# Patient Record
Sex: MALE | Race: WHITE | NOT HISPANIC OR LATINO | Employment: UNEMPLOYED | URBAN - METROPOLITAN AREA
[De-identification: names, ages, dates, MRNs, and addresses within clinical notes are randomized per-mention and may not be internally consistent; named-entity substitution may affect disease eponyms.]

---

## 2017-02-07 ENCOUNTER — OFFICE VISIT (OUTPATIENT)
Dept: OPTOMETRY | Facility: CLINIC | Age: 62
End: 2017-02-07

## 2017-02-07 ENCOUNTER — OFFICE VISIT (OUTPATIENT)
Dept: OPHTHALMOLOGY | Facility: CLINIC | Age: 62
End: 2017-02-07
Attending: OPHTHALMOLOGY
Payer: MEDICARE

## 2017-02-07 DIAGNOSIS — H02.226: ICD-10-CM

## 2017-02-07 DIAGNOSIS — H18.602 KERATOCONUS OF LEFT EYE: ICD-10-CM

## 2017-02-07 DIAGNOSIS — H16.212 EXPOSURE KERATOCONJUNCTIVITIS, LEFT: Primary | ICD-10-CM

## 2017-02-07 DIAGNOSIS — H16.212 EXPOSURE KERATOPATHY, LEFT: Primary | ICD-10-CM

## 2017-02-07 DIAGNOSIS — H18.712 CORNEAL ECTASIA DUE TO LASER IN SITU KERATOMILEUSIS OF LEFT EYE: ICD-10-CM

## 2017-02-07 DIAGNOSIS — H59.89 CORNEAL ECTASIA DUE TO LASER IN SITU KERATOMILEUSIS OF LEFT EYE: ICD-10-CM

## 2017-02-07 PROCEDURE — 99213 OFFICE O/P EST LOW 20 MIN: CPT | Mod: ZF

## 2017-02-07 ASSESSMENT — REFRACTION_CURRENTRX
OS_SPHERE: -5.00
OS_BASECURVE: 5.0
OS_DIAMETER: 15.8
OS_BRAND: MSD

## 2017-02-07 ASSESSMENT — VISUAL ACUITY
OS_CC: 20/25-2
OD_SC+: 2
CORRECTION_TYPE: CONTACTS
METHOD: SNELLEN - LINEAR
OS_CC+: -2
CORRECTION_TYPE: CONTACTS
OS_CC: 20/25
OD_SC: 20/30
OD_SC: 20/30-2
METHOD: SNELLEN - LINEAR

## 2017-02-07 ASSESSMENT — CONF VISUAL FIELD
OS_NORMAL: 1
OD_NORMAL: 1

## 2017-02-07 ASSESSMENT — TONOMETRY
OD_IOP_MMHG: 08
OS_IOP_MMHG: 08
IOP_METHOD: TONOPEN

## 2017-02-07 ASSESSMENT — EXTERNAL EXAM - RIGHT EYE
OD_EXAM: NORMAL
OD_EXAM: NORMAL

## 2017-02-07 ASSESSMENT — CUP TO DISC RATIO
OS_RATIO: 0.4
OD_RATIO: 0.3

## 2017-02-07 ASSESSMENT — SLIT LAMP EXAM - LIDS
COMMENTS: NORMAL
COMMENTS: NORMAL

## 2017-02-07 NOTE — PROGRESS NOTES
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 Cevallos today    We discussed several options for optimization of his treatment. I feel he would do well with a Hydra-PEG coating to improve front surface wetting/clouding. This would require a refit, which he needs anyway as he is unable to tolerate his current lens past 8-10 hours. We can do this with a standard lab (likely [facility]) or with an Eyeprint lens. Pro's/con's of each were discussed. He prefers strongly to go with an Eyeprint lens.  I would like him to discuss with Dr. Cevallos whether crosslinking is recommended - if he does proceed with that we should wait for refit until after. If deferring, he should return at his convenience after min 48 hours out of lens for impression.

## 2017-02-07 NOTE — NURSING NOTE
Chief Complaints and History of Present Illnesses   Patient presents with     Follow Up For     Exposure keratoconjunctivitis, left       HPI    Symptoms:     No blurred vision   No decreased vision   No floaters   No flashes         Do you have eye pain now?:  Yes   Location:  OS   Pain Frequency:  Constant      Comments:  Annual follow up Exposure keratoconjunctivitis, left .  The patient notes that he saw Dr. Friedman this morning.  The patient notes that his right eye can't see after being in the light to the dark.    JONATHAN Henderson 12:44 PM 02/07/2017

## 2017-02-07 NOTE — PROGRESS NOTES
Cc: f/u exposure keratopathy    HPI: Chirag Narvaez is a 61 yea old male who presents for f/u and evaluation of left eye lagophthalmos with use of scleral lens. Patient states doing well with use of scleral lens OU (OS chronic), with continued improvement of pain and irritation. Saw Dr Friedman today, fitted for new Sceral lens with Hydra-PEG coating. Vision stable    Still has issues with chronic discomfort on further discussion    POHx:  -hx of LASIK OU with post-LASIK ectasia   -hx of   -hx of scleral lens use OU    Current Meds:   -PF AT gtts and scleral lens gtts     Assessment & Plan   Chirag Narvaez is a 60 year old male with the following diagnoses:     1. Exposure keratoconjunctivitis, left  Secondary to facial surgery, lid dysfunction  Much improved with scleral lens, tarso, gold weight  Inferior band of punctate erosions/exposure keratopathy  -recommend PFATs 3-4x daily. not using any lubricating drops currently    Still with signficant pain    2. Mechanical lagophthalmos, left  As above    3. Corneal ectasia, unspecified laterality  Post laser in situ keratomileusis (LASIK), with add'l thinning from #1  Appears stable  Vision unchanged    -continue Scleral lens    - restart Celluvisc tears  - nighttime ointment - Refresh pm - coached on technique    - suggested ASEDs - defer for now    Saw Dr DOLAN today- fitted for new Scleral lens with lubricating coating. Doing well.        RTC in 1 year, earlier if interested in reassessment to discuss / order ASEDS    Olvin Estrada MD  PGY3, Dept of Ophthalmology    ~~~~~~~~~~~~~~~~~~~~~~~~~~~~~~~~~~~~~~~~~~~~~~~~~~~~~~~~~~~~~~~~    I have personally examined the patient and agree with the assessment and plan as delineated by the resident / fellow.  I have confirmed the contents of the chief complaint, history of present illness, review of systems, and medical / surgical history sections and edited the note as needed.    Turner Cevallos MD, MA  Director,  Cornea & Anterior Segment  [facility] Department of Ophthalmology & Visual Neuroscience

## 2017-02-07 NOTE — MR AVS SNAPSHOT
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 keratomileusis of left eye            Follow-ups after your visit        Your next 10 appointments already scheduled     Feb 13, 2017  1:30 PM   Return Visit with Jeannine Friedman OD   Eye Clinic ([facility])    [facility]  Clinic 9a  [address] [place] [address]   [phone]            Mar 01, 2017  1:30 PM   Return Visit with Jeannine Friedman OD   Eye Clinic ([facility])    [facility] 9a  [address] [place] [address]   [phone]              Who to contact     Please call your clinic at [phone] to:    Ask questions about your health    Make or cancel appointments    Discuss your medicines    Learn about your test results    Speak to your doctor   If you have compliments or concerns about an experience at your clinic, or if you wish to file a complaint, please contact [facility] Patient Relations at [phone] or email us at [email or web address]         Additional Information About Your Visit        MyChart Information     [employer] gives you secure access to your electronic health record. If you see a primary care provider, you can also send messages to your care team and make appointments. If you have questions, please call your primary care clinic.  If you do not have a primary care provider, please call [phone] and they will assist you.      [employer] is an electronic gateway that provides easy, online access to your medical records. With [employer], you can request a clinic  appointment, read your test results, renew a prescription or communicate with your care team.     To access your existing account, please contact your [facility] or call [phone] for assistance.        Care EveryWhere ID     This is your Care EveryWhere ID. This could be used by other organizations to access your [place] medical records  [id number]         Blood Pressure from Last 3 Encounters:   06/13/14 124/81   05/17/12 121/73   05/01/12 111/67    Weight from Last 3 Encounters:   10/19/16 74.844 kg (165 lb)   06/24/15 74.163 kg (163 lb 8 oz)   01/07/15 70.761 kg (156 lb)              Today, you had the following     No orders found for display       Primary Care Provider Office Phone # Fax #    James Marquezdegary [phone]       [facility] [address] [facility] [address]        Thank you!     Thank you for choosing EYE CLINIC  for your care. Our goal is always to provide you with excellent care. Hearing back from our patients is one way we can continue to improve our services. Please take a few minutes to complete the written survey that you may receive in the mail after your visit with us. Thank you!             Your Updated Medication List - Protect others around you: Learn how to safely use, store and throw away your medicines at www.disposemymeds.org.          This list is accurate as of: 2/7/17  2:56 PM.  Always use your most recent med list.                   Brand Name Dispense Instructions for use    * AMYLASE CONCENTRATE PO      Take  by mouth.       * DIGESTIVE ENZYMES PO      Take  by mouth.       CURCUMIN          HERBAL ENERGY COMPLEX PO      Take  by mouth.       Iodine Bell          MAG-CAPS OR      Take  by mouth.       MODIFIED CITRUS PECTIN 100-25-2.5 MG Tabs      Take  by mouth.       UBIQUINOL PO      Take  by mouth.       VITAMIN D3          * Notice:  This list has 2 medication(s) that are the same as other  medications prescribed for you. Read the directions carefully, and ask your doctor or other care provider to review them with you.

## 2017-02-13 ENCOUNTER — OFFICE VISIT (OUTPATIENT)
Dept: OPTOMETRY | Facility: CLINIC | Age: 62
End: 2017-02-13

## 2017-02-13 DIAGNOSIS — H18.712 CORNEAL ECTASIA DUE TO LASER IN SITU KERATOMILEUSIS OF LEFT EYE: ICD-10-CM

## 2017-02-13 DIAGNOSIS — H16.212 EXPOSURE KERATOPATHY, LEFT: Primary | ICD-10-CM

## 2017-02-13 DIAGNOSIS — H59.89 CORNEAL ECTASIA DUE TO LASER IN SITU KERATOMILEUSIS OF LEFT EYE: ICD-10-CM

## 2017-02-13 ASSESSMENT — VISUAL ACUITY
OD_SC: 20/30-2
OS_SC: 20/200
METHOD: SNELLEN - LINEAR

## 2017-02-13 ASSESSMENT — REFRACTION_CURRENTRX
OS_SPHERE: PLANO
OS_SPHERE: -5.00
OS_DIAMETER: 16.6
OS_DIAMETER: 15.8
OS_BRAND: MSD

## 2017-02-13 NOTE — MR AVS SNAPSHOT
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)    [facility] 9th Fl  Clinic 9a  [address] [facility] [address]   [phone]              Who to contact     Please call your clinic at [phone] to:    Ask questions about your health    Make or cancel appointments    Discuss your medicines    Learn about your test results    Speak to your doctor   If you have compliments or concerns about an experience at your clinic, or if you wish to file a complaint, please contact [facility] Patient Relations at [phone] or email us at [email or web address]         Additional Information About Your Visit        MyChart Information     [employer] gives you secure access to your electronic health record. If you see a primary care provider, you can also send messages to your care team and make appointments. If you have questions, please call your primary care clinic.  If you do not have a primary care provider, please call [phone] and they will assist you.      [employer] is an electronic gateway that provides easy, online access to your medical records. With [employer], you can request a clinic appointment, read your test results, renew a prescription or communicate with your care team.     To access your existing account, please contact your [facility] or call [phone] for  assistance.        Care EveryWhere ID     This is your Care EveryWhere ID. This could be used by other organizations to access your [place] medical records  [id number]         Blood Pressure from Last 3 Encounters:   06/13/14 124/81   05/17/12 121/73   05/01/12 111/67    Weight from Last 3 Encounters:   10/19/16 74.8 kg (165 lb)   06/24/15 74.2 kg (163 lb 8 oz)   01/07/15 70.8 kg (156 lb)              Today, you had the following     No orders found for display       Primary Care Provider Office Phone # Fax #    James Goodwin [phone]       [facility] [address] [facility] [address]        Thank you!     Thank you for choosing EYE CLINIC  for your care. Our goal is always to provide you with excellent care. Hearing back from our patients is one way we can continue to improve our services. Please take a few minutes to complete the written survey that you may receive in the mail after your visit with us. Thank you!             Your Updated Medication List - Protect others around you: Learn how to safely use, store and throw away your medicines at www.disposemymeds.org.          This list is accurate as of: 2/13/17 11:59 PM.  Always use your most recent med list.                   Brand Name Dispense Instructions for use    * AMYLASE CONCENTRATE PO      Take  by mouth.       * DIGESTIVE ENZYMES PO      Take  by mouth.       CURCUMIN          HERBAL ENERGY COMPLEX PO      Take  by mouth.       Iodine Bell          MAG-CAPS OR      Take  by mouth.       MODIFIED CITRUS PECTIN 100-25-2.5 MG Tabs      Take  by mouth.       UBIQUINOL PO      Take  by mouth.       VITAMIN D3          * Notice:  This list has 2 medication(s) that are the same as other medications prescribed for you. Read the directions carefully, and ask your doctor or other care provider to review them with you.

## 2017-02-13 NOTE — PROGRESS NOTES
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 Resume AT as previous.    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 These are not cosmetic contact lenses.     Encounter Diagnoses   Name Primary?     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

## 2017-02-14 ASSESSMENT — SLIT LAMP EXAM - LIDS: COMMENTS: NORMAL

## 2017-02-14 ASSESSMENT — EXTERNAL EXAM - RIGHT EYE: OD_EXAM: NORMAL

## 2017-03-01 ENCOUNTER — OFFICE VISIT (OUTPATIENT)
Dept: OPTOMETRY | Facility: CLINIC | Age: 62
End: 2017-03-01

## 2017-03-01 DIAGNOSIS — H16.212 EXPOSURE KERATOPATHY, LEFT: Primary | ICD-10-CM

## 2017-03-01 DIAGNOSIS — H59.89 CORNEAL ECTASIA DUE TO LASER IN SITU KERATOMILEUSIS OF LEFT EYE: ICD-10-CM

## 2017-03-01 DIAGNOSIS — H18.712 CORNEAL ECTASIA DUE TO LASER IN SITU KERATOMILEUSIS OF LEFT EYE: ICD-10-CM

## 2017-03-01 ASSESSMENT — VISUAL ACUITY
OS_CC: 20/30-1
METHOD: SNELLEN - LINEAR
CORRECTION_TYPE: CONTACTS
OD_SC: 20/20

## 2017-03-01 ASSESSMENT — REFRACTION_CURRENTRX
OS_DIAMETER: 17.0
OS_SPHERE: +3.37
OS_BASECURVE: 8.129
OS_BRAND: EYEPRINT
OS_ADDL_SPECS: OPTIMUM EXTRA

## 2017-03-01 ASSESSMENT — SLIT LAMP EXAM - LIDS: COMMENTS: NORMAL

## 2017-03-01 ASSESSMENT — EXTERNAL EXAM - RIGHT EYE: OD_EXAM: NORMAL

## 2017-03-01 NOTE — MR AVS SNAPSHOT
After Visit Summary   3/1/2017    Chirag Narvaez    MRN: [id number]           Patient Information     Date Of Birth          1955        Visit Information        Provider Department      3/1/2017 1:30 PM [facility]ine [facility] OD [facility]         Follow-ups after your visit        Your next 10 appointments already scheduled     Mar 29, 2017  1:30 PM CDT   Return Visit with Jeannine Friedman OD   Eye Clinic ([facility])    [facility] 9th Fl  Clinic 9a  [address] [place] [address]   [phone]              Who to contact     Please call your clinic at [phone] to:    Ask questions about your health    Make or cancel appointments    Discuss your medicines    Learn about your test results    Speak to your doctor   If you have compliments or concerns about an experience at your clinic, or if you wish to file a complaint, please contact [facility] Patient Relations at [phone] or email us at [email or web address]         Additional Information About Your Visit        MyChart Information     [employer] gives you secure access to your electronic health record. If you see a primary care provider, you can also send messages to your care team and make appointments. If you have questions, please call your primary care clinic.  If you do not have a primary care provider, please call [phone] and they will assist you.      [employer] is an electronic gateway that provides easy, online access to your medical records. With [employer], you can request a clinic appointment, read your test results, renew a prescription or communicate with your care team.     To access your existing account, please contact your [facility] or call [phone] for assistance.        Care EveryWhere ID     This is your Care EveryWhere ID. This could be used by other organizations to access your [facility]  records  [id number]         Blood Pressure from Last 3 Encounters:   06/13/14 124/81   05/17/12 121/73   05/01/12 111/67    Weight from Last 3 Encounters:   10/19/16 74.8 kg (165 lb)   06/24/15 74.2 kg (163 lb 8 oz)   01/07/15 70.8 kg (156 lb)              Today, you had the following     No orders found for display       Primary Care Provider Office Phone # Fax #    James Goodwin [phone]       [facility] [address]  [place] [address]        Thank you!     Thank you for choosing EYE CLINIC  for your care. Our goal is always to provide you with excellent care. Hearing back from our patients is one way we can continue to improve our services. Please take a few minutes to complete the written survey that you may receive in the mail after your visit with us. Thank you!             Your Updated Medication List - Protect others around you: Learn how to safely use, store and throw away your medicines at www.disposemymeds.org.          This list is accurate as of: 3/1/17  2:05 PM.  Always use your most recent med list.                   Brand Name Dispense Instructions for use    * AMYLASE CONCENTRATE PO      Take  by mouth.       * DIGESTIVE ENZYMES PO      Take  by mouth.       CURCUMIN          HERBAL ENERGY COMPLEX PO      Take  by mouth.       Iodine Bell          MAG-CAPS OR      Take  by mouth.       MODIFIED CITRUS PECTIN 100-25-2.5 MG Tabs      Take  by mouth.       UBIQUINOL PO      Take  by mouth.       VITAMIN D3          * Notice:  This list has 2 medication(s) that are the same as other medications prescribed for you. Read the directions carefully, and ask your doctor or other care provider to review them with you.

## 2017-03-01 NOTE — PROGRESS NOTES
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

## 2017-03-29 ENCOUNTER — OFFICE VISIT (OUTPATIENT)
Dept: OPTOMETRY | Facility: CLINIC | Age: 62
End: 2017-03-29

## 2017-03-29 DIAGNOSIS — H59.89 CORNEAL ECTASIA DUE TO LASER IN SITU KERATOMILEUSIS OF LEFT EYE: ICD-10-CM

## 2017-03-29 DIAGNOSIS — H18.712 CORNEAL ECTASIA DUE TO LASER IN SITU KERATOMILEUSIS OF LEFT EYE: ICD-10-CM

## 2017-03-29 DIAGNOSIS — H16.212 EXPOSURE KERATOPATHY, LEFT: Primary | ICD-10-CM

## 2017-03-29 ASSESSMENT — VISUAL ACUITY
OS_CC: 20/40
OD_SC: 20/30
METHOD: SNELLEN - LINEAR
CORRECTION_TYPE: CONTACTS

## 2017-03-29 ASSESSMENT — REFRACTION_CURRENTRX
OS_ADDL_SPECS: OPTIMUM EXTRA
OS_SPHERE: +3.37
OS_BRAND: EYEPRINT
OS_BASECURVE: 8.129
OS_DIAMETER: 17.0

## 2017-03-29 ASSESSMENT — EXTERNAL EXAM - RIGHT EYE: OD_EXAM: NORMAL

## 2017-03-29 ASSESSMENT — SLIT LAMP EXAM - LIDS: COMMENTS: NORMAL

## 2017-03-29 ASSESSMENT — REFRACTION_MANIFEST
OD_CYLINDER: SPHERE
OD_SPHERE: -1.25

## 2017-03-29 NOTE — MR AVS SNAPSHOT
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           Follow-ups after your visit        Who to contact     Please call your clinic at [phone] to:    Ask questions about your health    Make or cancel appointments    Discuss your medicines    Learn about your test results    Speak to your doctor   If you have compliments or concerns about an experience at your clinic, or if you wish to file a complaint, please contact [facility] Patient Relations at [phone] or email us at [email or web address]         Additional Information About Your Visit        MyChart Information     "[employer] gives you secure access to your electronic health record. If you see a primary care provider, you can also send messages to your care team and make appointments. If you have questions, please call your primary care clinic.  If you do not have a primary care provider, please call [phone] and they will assist you.      [employer] is an electronic gateway that provides easy, online access to your medical records. With [employer], you can request a clinic appointment, read your test results, renew a prescription or communicate with your care team.     To access your existing account, please contact your [facility] or call [phone] for assistance.        Care EveryWhere ID     This is your Care EveryWhere ID. This could be used by other organizations to access your [place] medical records  [id number]         Blood Pressure from Last 3 Encounters:   06/13/14 124/81   05/17/12 121/73   05/01/12 111/67    Weight  from Last 3 Encounters:   10/19/16 74.8 kg (165 lb)   06/24/15 74.2 kg (163 lb 8 oz)   01/07/15 70.8 kg (156 lb)              Today, you had the following     No orders found for display       Primary Care Provider Office Phone # Fax #    James Goodwin [phone]       [facility] [address] [facility] [address]        Thank you!     Thank you for choosing EYE CLINIC  for your care. Our goal is always to provide you with excellent care. Hearing back from our patients is one way we can continue to improve our services. Please take a few minutes to complete the written survey that you may receive in the mail after your visit with us. Thank you!             Your Updated Medication List - Protect others around you: Learn how to safely use, store and throw away your medicines at www.disposemymeds.org.          This list is accurate as of: 3/29/17  3:08 PM.  Always use your most recent med list.                   Brand Name Dispense Instructions for use    * AMYLASE CONCENTRATE PO      Take  by mouth.       * DIGESTIVE ENZYMES PO      Take  by mouth.       CURCUMIN          HERBAL ENERGY COMPLEX PO      Take  by mouth.       Iodine Bell          MAG-CAPS OR      Take  by mouth.       MODIFIED CITRUS PECTIN 100-25-2.5 MG Tabs      Take  by mouth.       UBIQUINOL PO      Take  by mouth.       VITAMIN D3          * Notice:  This list has 2 medication(s) that are the same as other medications prescribed for you. Read the directions carefully, and ask your doctor or other care provider to review them with you.

## 2017-03-29 NOTE — PROGRESS NOTES
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

## 2017-08-17 ENCOUNTER — TELEPHONE (OUTPATIENT)
Dept: OPHTHALMOLOGY | Facility: CLINIC | Age: 62
End: 2017-08-17

## 2017-08-17 NOTE — TELEPHONE ENCOUNTER
Fine with a lens for 6 months, she would give the pt a green light to go ahead and have the stitch removed in the corner of his eye that was put there back in 2017 by [place].  He is doing fine with the lens, and he was told that he would then have to check in with Dr. Cevallos about removing that stitch.  Pt is wondering if he can make an appt with Dr. Cevallos, and have the stitch removed at that same appt.  Pt is coming from [place].  Please call pt at [phone] to let him know if that is possible      Left message with date time of appt sept 19th  Scheduled with dr. farah and dr. Cevallos same day per request    Direct number provided to confirm appt's or help reschedule if needed  Anthony Chambers RN 4:21 PM 08/17/17

## 2017-08-17 NOTE — TELEPHONE ENCOUNTER
----- Message from Hermila Hilary sent at 8/17/2017  4:02 PM CDT -----  Regarding: pt reporting in to let us know that Dr. Friedman told pt back in Feb 2017---if pt did..  Contact: [phone]  Fine with a lens for 6 months, she would give the pt a green light to go ahead and have the stitch removed in the corner of his eye that was put there back in 2017 by [place].  He is doing fine with the lens, and he was told that he would then have to check in with Dr. Cevallos about removing that stitch.  Pt is wondering if he can make an appt with Dr. Cevallos, and have the stitch removed at that same appt.  Pt is coming from [place].  Please call pt at [phone] to let him know if that is possible.    Thank you!  Camron PLASENCIA    Please DO NOT send this message and/or reply back to sender.  Call Center Representatives DO NOT respond to messages.

## 2017-08-21 ENCOUNTER — TELEPHONE (OUTPATIENT)
Dept: OPHTHALMOLOGY | Facility: CLINIC | Age: 62
End: 2017-08-21

## 2017-08-21 NOTE — TELEPHONE ENCOUNTER
Patient called wanting to set up appt to remove tarso.  I sent to fellow to see if that is a possible appt.

## 2017-09-12 ENCOUNTER — OFFICE VISIT (OUTPATIENT)
Dept: OPHTHALMOLOGY | Facility: CLINIC | Age: 62
End: 2017-09-12
Attending: OPHTHALMOLOGY
Payer: MEDICARE

## 2017-09-12 DIAGNOSIS — H18.602 KERATOCONUS OF LEFT EYE: ICD-10-CM

## 2017-09-12 DIAGNOSIS — H18.719: ICD-10-CM

## 2017-09-12 DIAGNOSIS — H16.212 EXPOSURE KERATOCONJUNCTIVITIS, LEFT: Primary | ICD-10-CM

## 2017-09-12 DIAGNOSIS — H02.226: ICD-10-CM

## 2017-09-12 PROCEDURE — 99213 OFFICE O/P EST LOW 20 MIN: CPT | Mod: ZF

## 2017-09-12 ASSESSMENT — SLIT LAMP EXAM - LIDS: COMMENTS: NORMAL

## 2017-09-12 ASSESSMENT — CONF VISUAL FIELD
OD_NORMAL: 1
OS_NORMAL: 1
METHOD: COUNTING FINGERS

## 2017-09-12 ASSESSMENT — EXTERNAL EXAM - RIGHT EYE: OD_EXAM: NORMAL

## 2017-09-12 ASSESSMENT — TONOMETRY
OD_IOP_MMHG: 10
IOP_METHOD: ICARE
OS_IOP_MMHG: RGP

## 2017-09-12 ASSESSMENT — VISUAL ACUITY
METHOD: SNELLEN - LINEAR
OS_CC+: -2
OD_SC+: +1
OD_SC: 20/30
OS_CC: 20/20

## 2017-09-12 NOTE — PROGRESS NOTES
Cc: f/u exposure keratopathy    HPI: Chirag Narvaez is a 61 yea old male who presents for f/u and evaluation of left eye lagophthalmos with use of scleral lens. Patient states doing well with use of scleral lens OU (OS chronic), with continued improvement of pain and irritation. Saw Dr Friedman today, fitted for new Sceral lens with Hydra-PEG coating. Vision stable    Interval:  Very happy with scleral contact lens and tranquil eyes. Stable vision, denies redness, flashes, floaters. Improved pain.    POHx:  -hx of LASIK OU with post-LASIK ectasia    -hx of scleral lens use OU    Current Meds:   -PF AT gtts and scleral lens gtts: stopped  Refresh danyel: stopped    Assessment & Plan   Chirag Narvaez is a 60 year old male with the following diagnoses:     1. Exposure keratoconjunctivitis, left  Secondary to facial surgery, lid dysfunction  Much improved with scleral lens, tarso, gold weight  Improved surface  recommend PFATs 2-4x daily. Not using any lubricating drops currently    2. Mechanical lagophthalmos, left  As above    3. Corneal ectasia, unspecified laterality  Post laser in situ keratomileusis (LASIK), with add'l thinning from #1  Appears stable  Vision unchanged    Counseled:    rec against opening tarso given current stability, comfort of scleral lens and good vision    - continue Scleral lens  - restart Celluvisc tears  - restart nighttime ointment - Refresh pm    - suggested ASEDs - defer for now  - Continue f/up with Dr. MAGDALENO VILLALTA in 1 year, earlier if interested in reassessment to discuss / order ASEDS, with shyanne OU      ROB Meneses  Cornea fellow      ~~~~~~~~~~~~~~~~~~~~~~~~~~~~~~~~~~~~~~~~~~~~~~~~~~~~~~~~~~~~~~~~    Complete documentation of historical and exam elements from today's encounter can be found in the full encounter summary report (not reduplicated in this progress note). I personally obtained the chief complaint(s) and history of present illness.  I confirmed and edited as  necessary the review of systems, past medical/surgical history, family history, social history, and examination findings as documented by others.  I examined the patient myself, and I personally reviewed the relevant tests, images, and reports as documented above. I formulated and edited as necessary the assessment and plan and discussed the findings and management plan with the patient and family.     Turner Cevallos MD, MA  Director, Cornea & Anterior Segment  [facility] Department of Ophthalmology & Visual Neuroscience

## 2017-09-12 NOTE — NURSING NOTE
Chief Complaints and History of Present Illnesses   Patient presents with     Follow Up For     Exposure keratopathy/lagophthalmos      HPI    Affected eye(s):  Both   Symptoms:        Frequency:  Constant          Comments:  States va is the same since last visit  No red watery or dry  Quyen Sanz COT 3:27 PM September 12, 2017

## 2017-09-12 NOTE — MR AVS SNAPSHOT
After Visit Summary   9/12/2017    Chirag Narvaez    MRN: [id number]           Patient Information     Date Of Birth          1955        Visit Information        Provider Department      9/12/2017 2:45 PM Turner Cevallos MD [facility]        Today's Diagnoses     Exposure keratoconjunctivitis, left - Left Eye    -  1    Mechanical lagophthalmos, left - Left Eye        Keratoconus of left eye - Left Eye        Corneal ectasia, unspecified laterality - Left Eye           Follow-ups after your visit        Follow-up notes from your care team     Return in about 1 year (around 9/12/2018) for Follow Up, Topography OU.      Your next 10 appointments already scheduled     Nov 08, 2017 11:30 AM CST   (Arrive by 11:15 AM)   Return Visit with James Patiño MD   [facility] Ear Nose and Throat ([facility])    [address] [place] [id number] [phone]              Who to contact     Please call your clinic at [phone] to:    Ask questions about your health    Make or cancel appointments    Discuss your medicines    Learn about your test results    Speak to your doctor   If you have compliments or concerns about an experience at your clinic, or if you wish to file a complaint, please contact [facility] Patient Relations at [phone] or email us at [email or web address]         Additional Information About Your Visit        MyChart Information     [employer] gives you secure access to your electronic health record. If you see a primary care provider, you can also send messages to your care team and make appointments. If you have questions, please call your primary care clinic.  If you do not have a primary care provider, please call [phone] and they will assist you.      [employer] is an electronic gateway that provides easy, online access to your medical records. With [employer], you can request a clinic  appointment, read your test results, renew a prescription or communicate with your care team.     To access your existing account, please contact your [facility] or call [phone] for assistance.        Care EveryWhere ID     This is your Care EveryWhere ID. This could be used by other organizations to access your [place] medical records  [id number]         Blood Pressure from Last 3 Encounters:   06/13/14 124/81   05/17/12 121/73   05/01/12 111/67    Weight from Last 3 Encounters:   10/19/16 74.8 kg (165 lb)   06/24/15 74.2 kg (163 lb 8 oz)   01/07/15 70.8 kg (156 lb)              Today, you had the following     No orders found for display       Primary Care Provider Office Phone # Fax #    James Goodwin [phone]       [facility] [address] [facility] [address]        Equal Access to Services     MISHA ALMODOVAR : Hadii aad ku hadasho Soomaali, waaxda luqadaha, qaybta kaalmada adeegyada, waxay idiin hayaan adeeg kharash sadia . So [facility] [phone].    ATENCIÓN: Si luannla español, tiene a dupont disposición servicios gratuitos de asistencia lingüística. Llame al [phone].    We comply with applicable federal civil rights laws and [place] laws. We do not discriminate on the basis of race, color, national origin, age, disability sex, sexual orientation or gender identity.            Thank you!     Thank you for choosing EYE CLINIC  for your care. Our goal is always to provide you with excellent care. Hearing back from our patients is one way we can continue to improve our services. Please take a few minutes to complete the written survey that you may receive in the mail after your visit with us. Thank you!             Your Updated Medication List - Protect others around you: Learn how to safely use, store and throw away your medicines at www.disposemymeds.org.          This list is accurate as of: 9/12/17 11:59 PM.  Always use your  most recent med list.                   Brand Name Dispense Instructions for use Diagnosis    * AMYLASE CONCENTRATE PO      Take  by mouth.        * DIGESTIVE ENZYMES PO      Take  by mouth.        CURCUMIN           HERBAL ENERGY COMPLEX PO      Take  by mouth.        Iodine Bell           MAG-CAPS OR      Take  by mouth.        MODIFIED CITRUS PECTIN 100-25-2.5 MG Tabs      Take  by mouth.        UBIQUINOL PO      Take  by mouth.        VITAMIN D3           * Notice:  This list has 2 medication(s) that are the same as other medications prescribed for you. Read the directions carefully, and ask your doctor or other care provider to review them with you.

## 2017-11-02 DIAGNOSIS — C49.9 FIBROMYXOSARCOMA (H): Primary | ICD-10-CM

## 2017-11-08 ENCOUNTER — OFFICE VISIT (OUTPATIENT)
Dept: OTOLARYNGOLOGY | Facility: CLINIC | Age: 62
End: 2017-11-08

## 2017-11-08 VITALS — BODY MASS INDEX: 24.73 KG/M2 | WEIGHT: 167 LBS | HEIGHT: 69 IN

## 2017-11-08 DIAGNOSIS — C49.9 FIBROMYXOSARCOMA (H): Primary | ICD-10-CM

## 2017-11-08 RX ORDER — OFLOXACIN 3 MG/ML
5 SOLUTION AURICULAR (OTIC) 2 TIMES DAILY
Qty: 5 ML | Refills: 1 | Status: SHIPPED | OUTPATIENT
Start: 2017-11-08 | End: 2017-11-18

## 2017-11-08 ASSESSMENT — PAIN SCALES - GENERAL: PAINLEVEL: MILD PAIN (2)

## 2017-11-08 NOTE — MR AVS SNAPSHOT
After Visit Summary   11/8/2017    Chirag Narvaez    MRN: [id number]           Patient Information     Date Of Birth          1955        Visit Information        Provider Department      11/8/2017 11:30 AM James Patiño MD [facility] Ear Nose and Throat        Today's Diagnoses     Fibromyxosarcoma (H)    -  1      Care Instructions    MRI to be scheduled  An appt to have your ears cleaned will be schedu;ed  Follow up with Dr. Patiño in 1 year  Call me if ?'s arise [phone]  Merissa Morris RN              Follow-ups after your visit        Your next 10 appointments already scheduled     Nov 17, 2017  2:30 PM CST   (Arrive by 2:15 PM)   New Patient Visit with Vasquez Romo MD   [facility] Ear Nose and Throat ([facility] [place])    [address] [id number] [phone]            Nov 17, 2017  4:00 PM CST   MR SOFT TISSUE NECK W/O & W CONTRAST with UUMR1   [facility] [place], [facility]    [address] [facility] [id number] [phone]           Take your medicines as usual, unless your doctor tells you not to. Bring a list of your current medicines to your exam (including vitamins, minerals and over-the-counter drugs).  You will be given intravenous contrast for this exam. To prepare:   The day before your exam, drink extra fluids at least six 8-ounce glasses (unless your doctor tells you to restrict your fluids).   Have a blood test (creatinine test) within 30 days of your exam. Go to your clinic or Diagnostic Imaging Department for this test.  The MRI machine uses a strong magnet. Please wear clothes without metal (snaps, zippers). A sweatsuit works well, or we may give you a hospital gown.  Please remove any body piercings and hair extensions before you arrive. You will also remove watches, jewelry, hairpins, wallets, dentures, partial dental plates and hearing aids. You  may wear contact lenses, and you may be able to wear your rings. We have a safe place to keep your personal items, but it is safer to leave them at home.   **IMPORTANT** THE INSTRUCTIONS BELOW ARE ONLY FOR THOSE PATIENTS WHO HAVE BEEN TOLD THEY WILL RECEIVE SEDATION OR GENERAL ANESTHESIA DURING THEIR MRI PROCEDURE:  IF YOU WILL RECEIVE SEDATION (take medicine to help you relax during your exam):   You must get the medicine from your doctor before you arrive. Bring the medicine to the exam. Do not take it at home.   Arrive one hour early. Bring someone who can take you home after the test. Your medicine will make you sleepy. After the exam, you may not drive, take a bus or take a taxi by yourself.   No eating 8 hours before your exam. You may have clear liquids up until 4 hours before your exam. (Clear liquids include water, clear tea, black coffee and fruit juice without pulp.)  IF YOU WILL RECEIVE ANESTHESIA (be asleep for your exam):   Arrive 1 1/2 hours early. Bring someone who can take you home after the test. You may not drive, take a bus or take a taxi by yourself.   No eating 8 hours before your exam. You may have clear liquids up until 4 hours before your exam. (Clear liquids include water, clear tea, black coffee and fruit juice without pulp.)  Please call the Imaging Department at your exam site with any questions.            Nov 29, 2017 12:30 PM CST   New Visit with Ale Vergara MD   [facility]    [address] [place] [id number] [phone]            Dec 13, 2017   Procedure with David Jimenez MD   [facility])    [address] [facility] [id number] [phone]              Who to contact     Please call your clinic at [phone] to:    Ask questions about your health    Make or cancel appointments    Discuss your medicines    Learn about your test results    Speak to your doctor   If you have  "compliments or concerns about an experience at your clinic, or if you wish to file a complaint, please contact [facility] Patient Relations at [phone] or email us at [email or web address]         Additional Information About Your Visit        MyChart Information     [employer] gives you secure access to your electronic health record. If you see a primary care provider, you can also send messages to your care team and make appointments. If you have questions, please call your primary care clinic.  If you do not have a primary care provider, please call [phone] and they will assist you.      [employer] is an electronic gateway that provides easy, online access to your medical records. With [employer], you can request a clinic appointment, read your test results, renew a prescription or communicate with your care team.     To access your existing account, please contact your [facility] or call [phone] for assistance.        Care EveryWhere ID     This is your Care EveryWhere ID. This could be used by other organizations to access your [place] medical records  [id number]        Your Vitals Were     Height BMI (Body Mass Index)                1.753 m (5' 9\") 24.66 kg/m2           Blood Pressure from Last 3 Encounters:   06/13/14 124/81   05/17/12 121/73   05/01/12 111/67    Weight from Last 3 Encounters:   11/08/17 75.8 kg (167 lb)   10/19/16 74.8 kg (165 lb)   06/24/15 74.2 kg (163 lb 8 oz)              Today, you had the following     No orders found for display         Today's Medication Changes          These changes are accurate as of: 11/8/17 11:59 PM.  If you have any questions, ask your nurse or doctor.               Start taking these medicines.        Dose/Directions    ofloxacin 0.3 % otic solution   Commonly known as:  FLOXIN   Used for:  Fibromyxosarcoma (H)   Started by:  James Patiño MD        Dose:  5 drop   Place 5 drops into " both ears 2 times daily for 10 days   Quantity:  5 mL   Refills:  1            Where to get your medicines      These medications were sent to [facility], [place] - [place], [place] - [address] [facility]  [address] [facility] [address]     Phone:  [phone]     ofloxacin 0.3 % otic solution                Primary Care Provider Office Phone # Fax #    James Goodwin [phone]       [facility] [address]  [facility] [address]        Equal Access to Services     [facility]: Hadii aad ku hadasho Soomaali, waaxda luqadaha, qaybta kaalmada adeegyada, waxay idiin hayaan amadeo mccullough . So [facility] [phone].    ATENCIÓN: Si habla español, tiene a dupont disposición servicios gratuitos de asistencia lingüística. [facility] [phone].    We comply with applicable federal civil rights laws and [place] laws. We do not discriminate on the basis of race, color, national origin, age, disability, sex, sexual orientation, or gender identity.            Thank you!     Thank you for choosing [facility] EAR NOSE AND THROAT  for your care. Our goal is always to provide you with excellent care. Hearing back from our patients is one way we can continue to improve our services. Please take a few minutes to complete the written survey that you may receive in the mail after your visit with us. Thank you!             Your Updated Medication List - Protect others around you: Learn how to safely use, store and throw away your medicines at www.disposemymeds.org.          This list is accurate as of: 11/8/17 11:59 PM.  Always use your most recent med list.                   Brand Name Dispense Instructions for use Diagnosis    * AMYLASE CONCENTRATE PO      Take  by mouth.        * DIGESTIVE ENZYMES PO      Take  by mouth.        CURCUMIN           HERBAL ENERGY COMPLEX PO      Take  by mouth.        Iodine Bell           MAG-CAPS OR      Take  by mouth.        MODIFIED CITRUS  PECTIN 100-25-2.5 MG Tabs      Take  by mouth.        ofloxacin 0.3 % otic solution    FLOXIN    5 mL    Place 5 drops into both ears 2 times daily for 10 days    Fibromyxosarcoma (H)       UBIQUINOL PO      Take  by mouth.        VITAMIN D3           * Notice:  This list has 2 medication(s) that are the same as other medications prescribed for you. Read the directions carefully, and ask your doctor or other care provider to review them with you.

## 2017-11-08 NOTE — PROGRESS NOTES
November 8, 2017        PRIOR ONCOLOGIC HISTORY:  Mr. 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  He continues to do well from an oncologic perspective and his last MRI in on 6/3/2015 was negative.  He underwent a salvage parotidectomy, facial nerve sacrifice, and resection of the masseter muscle along with free flap reconstruction using a left lateral free flap by Dr. Tyler and reconstruction of the facial suspension apparatus by Dr. Jimenez on April 12, 2012.     HISTORY OF PRESENT ILLNESS:    Mr. Narvaez continues to do very well. He is now approximately 5 years out and has no evidence of disease. He feels good and has no pain. He reports that the pain is gone for the first time in years as well and he continues to better and better. He has not noticed any lumps or bumps.    PHYSICAL EXAMINATION:    Mr. Narvaez is here with his girlfriend.  He looks terrific. He is in good spirits. The flap is in good position and there are no palpable masses on either side of the neck or parotid. His tendon is still intact and he has good facial position. Both ears are filled with a waxy brown cerumen that would be difficult to remove by suction or by curetting.     IMPRESSION AND PLAN:    Mr. Narvaez is doing great, and has now reached his 5-year anniversary.  We still need to obtain a MRI scan since that hasn't been done in some time. Unfortunately the MRI scan machine is down today and this week. Merissa will work with him and get this scheduled in the near future.     In the meantime, we will give him some eardrops and he will return in a couple weeks to see me to have the cerumen removed.    James Patiño MD  Otolaryngology/Head & Neck Surgery  [phone]              cc: Lukas Baer MD           Josefa Currie MD            Moses Tyler MD

## 2017-11-08 NOTE — LETTER
11/8/2017       RE: Chirag Narvaez  [address] [place] [address]     Dear Colleague,    Thank you for referring your patient, Chirag Narvaez, to the [facility] E[facility] at [facility]. Please see a copy of my visit note below.    November 8, 2017        PRIOR ONCOLOGIC HISTORY:  Mr. 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  He continues to do well from an oncologic perspective and his last MRI in on 6/3/2015 was negative.  He underwent a salvage parotidectomy, facial nerve sacrifice, and resection of the masseter muscle along with free flap reconstruction using a left lateral free flap by Dr. Tyler and reconstruction of the facial suspension apparatus by Dr. Jimenez on April 12, 2012.     HISTORY OF PRESENT ILLNESS:    Mr. Narvaez continues to do very well. He is now approximately 5 years out and has no evidence of disease. He feels good and has no pain. He reports that the pain is gone for the first time in years as well and he continues to better and better. He has not noticed any lumps or bumps.    PHYSICAL EXAMINATION:    Mr. Narvaez is here with his girlfriend.  He looks terrific. He is in good spirits. The flap is in good position and there are no palpable masses on either side of the neck or parotid. His tendon is still intact and he has good facial position. Both ears are filled with a waxy brown cerumen that would be difficult to remove by suction or by curetting.     IMPRESSION AND PLAN:    Mr. Narvaez is doing great, and has now reached his 5-year anniversary.  We still need to obtain a MRI scan since that hasn't been done in some time. Unfortunately the MRI scan machine is down today and this week. Merissa will work with him and get this scheduled in the near future.     In the meantime, we will give him some eardrops and he will return in a couple weeks to see me to have the cerumen removed.    James  MD Kaylyn  Otolaryngology/Head & Neck Surgery  [phone]    cc: Lukas Baer MD    [facility]       Josefa Currie MD     [facility]       Moses Tyler MD    [facility]

## 2017-11-08 NOTE — NURSING NOTE
Chief Complaint   Patient presents with     RECHECK     annual f/u fibromyxosarcoma     Roro Leiva Medical Assistant

## 2017-11-08 NOTE — PATIENT INSTRUCTIONS
MRI to be scheduled  An appt to have your ears cleaned will be schedu;ed  Follow up with Dr. Patiño in 1 year  Call me if ?'s arise [phone]  Merissa Morris RN

## 2017-11-09 ENCOUNTER — OFFICE VISIT (OUTPATIENT)
Dept: OTOLARYNGOLOGY | Facility: CLINIC | Age: 62
End: 2017-11-09
Payer: MEDICARE

## 2017-11-09 DIAGNOSIS — C49.9 FIBROMYXOSARCOMA (H): Primary | ICD-10-CM

## 2017-11-09 PROCEDURE — 99213 OFFICE O/P EST LOW 20 MIN: CPT | Performed by: OTOLARYNGOLOGY

## 2017-11-09 ASSESSMENT — PAIN SCALES - GENERAL: PAINLEVEL: SEVERE PAIN (6)

## 2017-11-09 NOTE — MR AVS SNAPSHOT
After Visit Summary   11/9/2017    Chirag Narvaez    MRN: [id number]           Patient Information     Date Of Birth          1955        Visit Information        Provider Department      11/9/2017 12:30 PM David Jimenez MD [facility]        Today's Diagnoses     Fibromyxosarcoma (H)    -  1       Follow-ups after your visit        Your next 10 appointments already scheduled     Nov 17, 2017  2:30 PM CST   (Arrive by 2:15 PM)   New Patient Visit with Vasquez Romo MD   [facility] ([facility])    [address] [id number] [phone]            Nov 17, 2017  4:00 PM CST   MR SOFT TISSUE NECK W/O & W CONTRAST with UUMR1   [facility], [place], [facility] ([facility], [facility])    [address] [facility] [id number] [phone]           Take your medicines as usual, unless your doctor tells you not to. Bring a list of your current medicines to your exam (including vitamins, minerals and over-the-counter drugs).  You will be given intravenous contrast for this exam. To prepare:   The day before your exam, drink extra fluids at least six 8-ounce glasses (unless your doctor tells you to restrict your fluids).   Have a blood test (creatinine test) within 30 days of your exam. Go to your clinic or Diagnostic Imaging Department for this test.  The MRI machine uses a strong magnet. Please wear clothes without metal (snaps, zippers). A sweatsuit works well, or we may give you a hospital gown.  Please remove any body piercings and hair extensions before you arrive. You will also remove watches, jewelry, hairpins, wallets, dentures, partial dental plates and hearing aids. You may wear contact lenses, and you may be able to wear your rings. We have a safe place to keep your personal items, but it is safer to leave them at home.   **IMPORTANT** THE  INSTRUCTIONS BELOW ARE ONLY FOR THOSE PATIENTS WHO HAVE BEEN TOLD THEY WILL RECEIVE SEDATION OR GENERAL ANESTHESIA DURING THEIR MRI PROCEDURE:  IF YOU WILL RECEIVE SEDATION (take medicine to help you relax during your exam):   You must get the medicine from your doctor before you arrive. Bring the medicine to the exam. Do not take it at home.   Arrive one hour early. Bring someone who can take you home after the test. Your medicine will make you sleepy. After the exam, you may not drive, take a bus or take a taxi by yourself.   No eating 8 hours before your exam. You may have clear liquids up until 4 hours before your exam. (Clear liquids include water, clear tea, black coffee and fruit juice without pulp.)  IF YOU WILL RECEIVE ANESTHESIA (be asleep for your exam):   Arrive 1 1/2 hours early. Bring someone who can take you home after the test. You may not drive, take a bus or take a taxi by yourself.   No eating 8 hours before your exam. You may have clear liquids up until 4 hours before your exam. (Clear liquids include water, clear tea, black coffee and fruit juice without pulp.)  Please call the Imaging Department at your exam site with any questions.            Nov 29, 2017 12:30 PM CST   New Visit with Ale Vergara MD   [facility]    [address] [place] [address] [phone]              Who to contact     If you have questions or need follow up information about today's clinic visit or your schedule please contact [facility] directly at [phone].  Normal or non-critical lab and imaging results will be communicated to you by MyChart, letter or phone within 4 business days after the clinic has received the results. If you do not hear from us within 7 days, please contact the clinic through MyChart or phone. If you have a critical or abnormal lab result, we will notify you by phone as soon as possible.  Submit refill  requests through [employer] or call your pharmacy and they will forward the refill request to us. Please allow 3 business days for your refill to be completed.          Additional Information About Your Visit        [facility] Information     [employer] gives you secure access to your electronic health record. If you see a primary care provider, you can also send messages to your care team and make appointments. If you have questions, please call your primary care clinic.  If you do not have a primary care provider, please call [phone] and they will assist you.      [employer] is an electronic gateway that provides easy, online access to your medical records. With [employer], you can request a clinic appointment, read your test results, renew a prescription or communicate with your care team.     To access your existing account, please contact your [facility] or call [phone] for assistance.        Care EveryWhere ID     This is your Care EveryWhere ID. This could be used by other organizations to access your [place] medical records  [id number]         Blood Pressure from Last 3 Encounters:   06/13/14 124/81   05/17/12 121/73   05/01/12 111/67    Weight from Last 3 Encounters:   11/08/17 75.8 kg (167 lb)   10/19/16 74.8 kg (165 lb)   06/24/15 74.2 kg (163 lb 8 oz)              Today, you had the following     No orders found for display       Primary Care Provider Office Phone # Fax #    James Goodwin [phone]       [facility] [address] [facility] [address]        Equal Access to Services     MISHA ALMODOVAR : Hadii aad ku hadasho Soomaali, waaxda luqadaha, qaybta kaalmada adeegyada, waxay gisell mccullough . So [facility] [phone].    ATENCIÓN: Si habla español, tiene a dupont disposición servicios gratuitos de asistencia lingüística. Llame al [phone].    We comply with applicable federal civil rights laws and [place] laws.  We do not discriminate on the basis of race, color, national origin, age, disability, sex, sexual orientation, or gender identity.            Thank you!     Thank you for choosing [facility]  for your care. Our goal is always to provide you with excellent care. Hearing back from our patients is one way we can continue to improve our services. Please take a few minutes to complete the written survey that you may receive in the mail after your visit with us. Thank you!             Your Updated Medication List - Protect others around you: Learn how to safely use, store and throw away your medicines at www.disposemymeds.org.          This list is accurate as of: 11/9/17  1:48 PM.  Always use your most recent med list.                   Brand Name Dispense Instructions for use Diagnosis    * AMYLASE CONCENTRATE PO      Take  by mouth.        * DIGESTIVE ENZYMES PO      Take  by mouth.        CURCUMIN           HERBAL ENERGY COMPLEX PO      Take  by mouth.        Iodine Bell           MAG-CAPS OR      Take  by mouth.        MODIFIED CITRUS PECTIN 100-25-2.5 MG Tabs      Take  by mouth.        ofloxacin 0.3 % otic solution    FLOXIN    5 mL    Place 5 drops into both ears 2 times daily for 10 days    Fibromyxosarcoma (H)       UBIQUINOL PO      Take  by mouth.        VITAMIN D3           * Notice:  This list has 2 medication(s) that are the same as other medications prescribed for you. Read the directions carefully, and ask your doctor or other care provider to review them with you.

## 2017-11-09 NOTE — NURSING NOTE
"Chirag Narvaez's goals for this visit include:   Chief Complaint   Patient presents with     Wound Check     discuss fat transfer to cheek, previous facial reanimation procedure       He requests these members of his care team be copied on today's visit information: James Goodwin      PCP: James Goodwin    Referring Provider:  No referring provider defined for this encounter.    Chief Complaint   Patient presents with     Wound Check     discuss fat transfer to cheek, previous facial reanimation procedure       Initial There were no vitals taken for this visit. Estimated body mass index is 24.66 kg/(m^2) as calculated from the following:    Height as of 11/8/17: 1.753 m (5' 9\").    Weight as of 11/8/17: 75.8 kg (167 lb).  Medication Reconciliation: complete    Do you need any medication refills at today's visit? No    Alexa Mathew RN    "

## 2017-11-09 NOTE — PROGRESS NOTES
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   4/12/12: resection of recurrent left cheek fibromyxosarcoma. Proximal facial nerve branches were not available so nerve grafting was not performed. S/P orthodromic temporalis tendon transfer with fascia demetria sling. S/p ALT free flap by Dr. Tyler. Has soft tissue contour deformity due to fascial attachment at left melolabial fold.   6/13/14: s/p left detachment of dermis from underlying perioral tendon (path= no residual tumor).  2) Left cheek and soft tissue defect from recurrent fibromyxosarcoma excision s/p ALT free flap by Dr. Tyler 4/12/12. Has soft tissue deformity due to tissue mismatch at the anterior cheek margin and jawline.  6/13/14: s/p left ALT flap contouring of jawline and anterior cheek (path= no residual tumor).  3) Detached left earlobe.  6/13/14: s/p left earlobe reattachment.   Comorbidities: None   Pertinent medications: None   Checklist:   _Offered stage 3 reconstruction of his superior melolabial crease with tissue excision and complex closure or local flaps to address the fold of his left upper lip from redundant skin.   _We also discussed fat injection of his medial cheek but I am concerned that late effects of radiation and scarring in the area wound make the take poor.   _Monitor the medial aspect of his left eyelid weight implant. Offered to have him see colleagues from oculoplastics to discuss whether to remove and replace that implant or wait until it extrudes before taking action.     This office note has been dictated.  David Jimenez

## 2017-11-10 NOTE — PROGRESS NOTES
Today, I had the pleasure of seeing Mr. Chirag Narvaez at the [facility] Clinic in the Department of Otolaryngology at the [facility], operated by [facility].  He follows up for further evaluation of his facial reconstruction.  He reports overall he has been doing very well.  He has been encouraged by his oncologic visits that have shown no evidence of disease.  We celebrated this milestone.  He wanted to come by today to discuss 2 separate issues.  First he is wondering if anything more could be done with his facial reconstruction.  Overall, he is quite happy with it and he has continued to thrive in his life.  The second issue is that he has noticed thinning over his eyelid weight.  This is on the medial inferior corner of his left upper eyelid weight.  He is wondering if this is a concern.      PHYSICAL EXAMINATION:  He is in no apparent distress.  Pleasant affect.  Normal ability to communicate.  Normal respiratory effort.  No stridor.  Voice is strong.  His left facial reconstruction appears intact.  He has a curvilinear brow lift incision which is clean, dry and intact.  There is no evidence of brow ptosis.  His left upper eyelid weight is in place.  The skin over the medial inferior aspect of the eyelid weight is very thin; however, there is no evidence of implant exposure at this time.  It is also hanging at his eyelid margin on the left side.  His facial suspension is secure.  When he bites down he is able to have excursion of his face laterally and superiorly.  He does have some tethering of the construct to his dermis.  His transferred free flap of his left preauricular area is 100% viable.  It does have color mismatch.  Of his lip structure, he has a fold of redundant tissue of his upper lip.  This fold of redundant tissue creates an abnormal soft tissue contour.  Suspension of this upper lip tissue superiorly results in resolution of the abnormal  folding and redundant tissue.  He has no superior melolabial crease due to his facial paralysis.  His lower lip midline has been shifted to the left due to the temporalis suspension.      PREOPERATIVE COUNSELING:  An extensive preoperative discussion was held.  The patient stated he understood the risks, benefits, alternatives and limitations of the procedure.  The risks including but not limited to bleeding, infection, damage to surrounding structures, numbness, unfavorable change in his appearance, wound dehiscence and unforeseen complications related to surgery or anesthesia were discussed.  He stated he had his questions answered to his satisfaction.  He requested to proceed with the planned procedure.      IMPRESSION AND RECOMMENDATIONS:  Left upper lip abnormal soft tissue contour due to folding of tissue due to fat from the absent superior melolabial crease.  With manual suspension this is improved.  I recommended a small incision with a superior suspension of the skin to resolve this abnormal soft tissue contour.  We discussed how this is a relatively small procedure and will only have a minor improvement.  He accepts the outcome of the limited improvement.        We also discussed the possibility of fat injections into his medial cheek area.  I am concerned that his fat take would be poor given his history of radiation at this site and extensive scarring over the temporalis tendon construct.  Finally, we discussed the significant thinning of his eyelid skin over his upper eyelid implant.  I recommend he see a Dr. Vergara, a colleague specializing in oculoplastic surgery to discuss whether this should be removed and replaced or simply monitored and intervention only taking place if the skin breaks down over the implant.      It has been a pleasure to see Mr. Narvaez today.  I look forward to seeing him on his procedure day.         JEFFERY KENDALL MD             D: 11/09/2017 13:44   T: 11/09/2017  22:15   MT: ESAU#[id number]      Name:     DOTTY FRANCISCO   MRN:      [id number]        Account:      [id number]   :      1955           Visit Date:   2017      Document: [id number]

## 2017-11-17 ENCOUNTER — HOSPITAL ENCOUNTER (OUTPATIENT)
Dept: MRI IMAGING | Facility: CLINIC | Age: 62
Discharge: HOME | End: 2017-11-17
Attending: OTOLARYNGOLOGY | Admitting: OTOLARYNGOLOGY
Payer: MEDICARE

## 2017-11-17 ENCOUNTER — OFFICE VISIT (OUTPATIENT)
Dept: OTOLARYNGOLOGY | Facility: CLINIC | Age: 62
End: 2017-11-17

## 2017-11-17 VITALS — BODY MASS INDEX: 23.91 KG/M2 | WEIGHT: 167 LBS | HEIGHT: 70 IN

## 2017-11-17 DIAGNOSIS — C49.9 FIBROMYXOSARCOMA (H): ICD-10-CM

## 2017-11-17 DIAGNOSIS — H61.23 BILATERAL IMPACTED CERUMEN: Primary | ICD-10-CM

## 2017-11-17 PROCEDURE — 25000128 H RX IP 250 OP 636: Performed by: RADIOLOGY

## 2017-11-17 PROCEDURE — 70543 MRI ORBT/FAC/NCK W/O &W/DYE: CPT

## 2017-11-17 PROCEDURE — A9585 GADOBUTROL INJECTION: HCPCS | Performed by: RADIOLOGY

## 2017-11-17 RX ORDER — GADOBUTROL 604.72 MG/ML
0.1 INJECTION INTRAVENOUS ONCE
Status: COMPLETED | OUTPATIENT
Start: 2017-11-17 | End: 2017-11-17

## 2017-11-17 RX ADMIN — GADOBUTROL 7.5 ML: 604.72 INJECTION INTRAVENOUS at 16:01

## 2017-11-17 ASSESSMENT — PAIN SCALES - GENERAL: PAINLEVEL: SEVERE PAIN (6)

## 2017-11-17 NOTE — PROGRESS NOTES
The patient presents with a history of cerumen impaction in the ears.  The patient does not have difficulty with infections of the ears.  The patient has had a free flap on the left side of the face and this has resulted in a slightly narrower left ear canal. The patient denies sinusitis, rhinitis, facial pain, nasal obstruction or purulent nasal discharge. The patient denies chronic or recurrent tonsillitis, chronic or recurrent pharyngitis. The patient denies otalgia, otorrhea, eustachian tube dysfunction, ear infections, dizziness or tinnitus.     This patient is seen in consultation as a self referral to my clinic.    All other systems were reviewed and they are either negative or they are not directly pertinent to this Otolaryngology examination.    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     Hearing problem      History of radiation therapy 9/5-10/25/2002    Dose 6.660 cGy     Liver disease 1996     Radiation 2008     Reduced vision      Tinnitus 1110004       Past Surgical History:    Past Surgical History:   Procedure Laterality Date     BLEPHAROPLASTY, BROW LIFT, COMBINED  2004     Brow lif[  2007     C VAS CLIPS  2003     CL AFF SURGICAL PATHOLOGY      mult tumor removal     GRAFT FREE VASCULARIZED (LOCATION)  4/12/2012    Procedure:GRAFT FREE VASCULARIZED (LOCATION); Left Anterolateral Thigh Free Tissue Transfer; Surgeon:ERIN PAUL; Location:[facility] OR     IMPLANT GOLD WEIGHT EYELID  2008, 2011     LIVER BIOPSY  1996     PAROTIDECTOMY  1984     PAROTIDECTOMY, RADICAL NECK DISSECTION  4/12/2012    Procedure:PAROTIDECTOMY, RADICAL NECK DISSECTION; Left Radical Parotidectomy with Resection of Over Lying Skin, Facial Nerve Sacrifice, Left Neck Dissection, Left Zygoma Resection; Orthodromic Temporalis Tendon Transfer; Left Anterolateral Thigh Free Tissue Transfer  ;  Surgeon:MARINA BRANHAM; Location:[facility] OR     REANIMATION FACE  4/12/2012    Procedure:REANIMATION FACE; Orthodromic temporalis tendon transfer. ; Surgeon:JEFFERY JIMENEZ; Location:[facility] OR     RECONSTRUCT FOREHEAD  6/13/2014    Procedure: RECONSTRUCT FOREHEAD;  Surgeon: Jeffery Jimenez MD;  Location: [facility] OR     REPAIR LID LOWER COSMETIC, REPAIR PTOSIS, COMBINED       REPAIR PTOSIS BROW BILATERAL         Medications:      Current Outpatient Prescriptions:      ofloxacin (FLOXIN) 0.3 % otic solution, Place 5 drops into both ears 2 times daily for 10 days, Disp: 5 mL, Rfl: 1     Digestive Enzymes (AMYLASE CONCENTRATE PO), Take  by mouth., Disp: , Rfl:      Pectin Cit-Inos-C-Bioflav-Soy (MODIFIED CITRUS PECTIN) 100-25-2.5 MG TABS, Take  by mouth., Disp: , Rfl:      Iodine GAVIN, , Disp: , Rfl:      DIGESTIVE ENZYMES PO, Take  by mouth., Disp: , Rfl:      UBIQUINOL PO, Take  by mouth., Disp: , Rfl:      Turmeric, Curcuma Longa, (CURCUMIN), , Disp: , Rfl:      Cholecalciferol (VITAMIN D3), , Disp: , Rfl:      Magnesium Oxide (MAG-CAPS OR), Take  by mouth., Disp: , Rfl:      Misc Natural Products (HERBAL ENERGY COMPLEX PO), Take  by mouth., Disp: , Rfl:     Allergies:    Nkda [no known drug allergies]    Physical Examination:    The patient is a well developed, well nourished male in no apparent distress.      Oral Cavity Examination:  Normal mucosa with no masses or lesions  Nasal Examination: Normal mucosa with no masses or lesions  Ear Examination: Ear canals impacted with cerumen bilaterally.  The ear canals are cleaned today using an alligator forceps, a currette, and a suction using a binocular microscope for visualization. The tympanic membranes and middle ear spaces normal bilaterally.  Neurological Examination: Facial nerve function intact and symmetric  Integumentary Examination: No lesions on the skin of the head and neck  Neck Examination: No masses or lesions, no lymphadenopathy  Endocrine Examination: Normal thyroid  examination    Assessment and Plan:    The patient presents with a history of cerumen impaction.  The patient's ears are cleaned today. \He will be seen again as needed.

## 2017-11-17 NOTE — LETTER
11/17/2017       RE: Chirag Narvaez  [address] [place] [address]     Dear Colleague,    Thank you for referring your patient, Chirag Narvaez, to the [facility] at [facility]. Please see a copy of my visit note below.    The patient presents with a history of cerumen impaction in the ears.  The patient does not have difficulty with infections of the ears.  The patient has had a free flap on the left side of the face and this has resulted in a slightly narrower left ear canal. The patient denies sinusitis, rhinitis, facial pain, nasal obstruction or purulent nasal discharge. The patient denies chronic or recurrent tonsillitis, chronic or recurrent pharyngitis. The patient denies otalgia, otorrhea, eustachian tube dysfunction, ear infections, dizziness or tinnitus.     This patient is seen in consultation as a self referral to my clinic.    All other systems were reviewed and they are either negative or they are not directly pertinent to this Otolaryngology examination.    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     Hearing problem      History of radiation therapy 9/5-10/25/2002    Dose 6.660 cGy     Liver disease 1996     Radiation 2008     Reduced vision      Tinnitus [id number]       Past Surgical History:    Past Surgical History:   Procedure Laterality Date     BLEPHAROPLASTY, BROW LIFT, COMBINED  2004     Brow lif[  2007     C VAS CLIPS  2003     CL AFF SURGICAL PATHOLOGY      mult tumor removal     GRAFT FREE VASCULARIZED (LOCATION)  4/12/2012    Procedure:GRAFT FREE VASCULARIZED (LOCATION); Left Anterolateral Thigh Free Tissue Transfer; Surgeon:ERIN PAUL; Location:[facility]     IMPLANT GOLD WEIGHT EYELID  2008, 2011     LIVER BIOPSY  1996     PAROTIDECTOMY  1984     PAROTIDECTOMY, RADICAL NECK  DISSECTION  4/12/2012    Procedure:PAROTIDECTOMY, RADICAL NECK DISSECTION; Left Radical Parotidectomy with Resection of Over Lying Skin, Facial Nerve Sacrifice, Left Neck Dissection, Left Zygoma Resection; Orthodromic Temporalis Tendon Transfer; Left Anterolateral Thigh Free Tissue Transfer  ; Surgeon:MARINA BRANHAM; Location:[facility] OR     REANIMATION FACE  4/12/2012    Procedure:REANIMATION FACE; Orthodromic temporalis tendon transfer. ; Surgeon:JEFFERY JIMENEZ; Location:[facility] OR     RECONSTRUCT FOREHEAD  6/13/2014    Procedure: RECONSTRUCT FOREHEAD;  Surgeon: Jeffery Jimenez MD;  Location: [facility] OR     REPAIR LID LOWER COSMETIC, REPAIR PTOSIS, COMBINED       REPAIR PTOSIS BROW BILATERAL         Medications:      Current Outpatient Prescriptions:      ofloxacin (FLOXIN) 0.3 % otic solution, Place 5 drops into both ears 2 times daily for 10 days, Disp: 5 mL, Rfl: 1     Digestive Enzymes (AMYLASE CONCENTRATE PO), Take  by mouth., Disp: , Rfl:      Pectin Cit-Inos-C-Bioflav-Soy (MODIFIED CITRUS PECTIN) 100-25-2.5 MG TABS, Take  by mouth., Disp: , Rfl:      Iodine GAVIN, , Disp: , Rfl:      DIGESTIVE ENZYMES PO, Take  by mouth., Disp: , Rfl:      UBIQUINOL PO, Take  by mouth., Disp: , Rfl:      Turmeric, Curcuma Longa, (CURCUMIN), , Disp: , Rfl:      Cholecalciferol (VITAMIN D3), , Disp: , Rfl:      Magnesium Oxide (MAG-CAPS OR), Take  by mouth., Disp: , Rfl:      Misc Natural Products (HERBAL ENERGY COMPLEX PO), Take  by mouth., Disp: , Rfl:     Allergies:    Nkda [no known drug allergies]    Physical Examination:    The patient is a well developed, well nourished male in no apparent distress.      Oral Cavity Examination:  Normal mucosa with no masses or lesions  Nasal Examination: Normal mucosa with no masses or lesions  Ear Examination: Ear canals impacted with cerumen bilaterally.  The ear canals are cleaned today using an alligator forceps, a currette, and a suction using a binocular microscope for visualization. The  tympanic membranes and middle ear spaces normal bilaterally.  Neurological Examination: Facial nerve function intact and symmetric  Integumentary Examination: No lesions on the skin of the head and neck  Neck Examination: No masses or lesions, no lymphadenopathy  Endocrine Examination: Normal thyroid examination    Assessment and Plan:    The patient presents with a history of cerumen impaction.  The patient's ears are cleaned today. \He will be seen again as needed.       Again, thank you for allowing me to participate in the care of your patient.      Sincerely,    Vasquez Romo MD

## 2017-11-17 NOTE — PATIENT INSTRUCTIONS
The patient presents with a history of cerumen impaction.  The patient's ears are cleaned today. \He will be seen again as needed.

## 2017-11-17 NOTE — MR AVS SNAPSHOT
After Visit Summary   11/17/2017    Chirag Narvaez    MRN: [id number]           Patient Information     Date Of Birth          1955        Visit Information        Provider Department      11/17/2017 2:30 PM Vasquez Romo MD [facility] Ear Nose and Thro[facility]        Today's Diagnoses     Bilateral impacted cerumen    -  1      Care Instructions    The patient presents with a history of cerumen impaction.  The patient's ears are cleaned today. \He will be seen again as needed.           Follow-ups after your visit        Your next 10 appointments already scheduled     Nov 17, 2017  4:00 PM CST   MR SOFT TISSUE NECK W/O & W CONTRAST with UUMR1   [facility], [place], MRI ([facility])    [address] [facility] [address] [phone]           Take your medicines as usual, unless your doctor tells you not to. Bring a list of your current medicines to your exam (including vitamins, minerals and over-the-counter drugs).  You will be given intravenous contrast for this exam. To prepare:   The day before your exam, drink extra fluids at least six 8-ounce glasses (unless your doctor tells you to restrict your fluids).   Have a blood test (creatinine test) within 30 days of your exam. Go to your clinic or Diagnostic Imaging Department for this test.  The MRI machine uses a strong magnet. Please wear clothes without metal (snaps, zippers). A sweatsuit works well, or we may give you a hospital gown.  Please remove any body piercings and hair extensions before you arrive. You will also remove watches, jewelry, hairpins, wallets, dentures, partial dental plates and hearing aids. You may wear contact lenses, and you may be able to wear your rings. We have a safe place to keep your personal items, but it is safer to leave them at home.   **IMPORTANT** THE INSTRUCTIONS BELOW ARE ONLY FOR THOSE PATIENTS WHO HAVE BEEN TOLD THEY WILL RECEIVE  SEDATION OR GENERAL ANESTHESIA DURING THEIR MRI PROCEDURE:  IF YOU WILL RECEIVE SEDATION (take medicine to help you relax during your exam):   You must get the medicine from your doctor before you arrive. Bring the medicine to the exam. Do not take it at home.   Arrive one hour early. Bring someone who can take you home after the test. Your medicine will make you sleepy. After the exam, you may not drive, take a bus or take a taxi by yourself.   No eating 8 hours before your exam. You may have clear liquids up until 4 hours before your exam. (Clear liquids include water, clear tea, black coffee and fruit juice without pulp.)  IF YOU WILL RECEIVE ANESTHESIA (be asleep for your exam):   Arrive 1 1/2 hours early. Bring someone who can take you home after the test. You may not drive, take a bus or take a taxi by yourself.   No eating 8 hours before your exam. You may have clear liquids up until 4 hours before your exam. (Clear liquids include water, clear tea, black coffee and fruit juice without pulp.)  Please call the Imaging Department at your exam site with any questions.            Nov 29, 2017 12:30 PM CST   [facility] Ale [facility]    [address] [place] [address]   [phone]            Dec 13, 2017   Procedure with David Jimenez MD   [facility] [place]    [address] [place] [address]   [phone]            Feb 22, 2018  1:00 PM CST   (Arrive by 12:45 PM)   Return Visit with Vasquez Romo MD   [facility] ([facility])    [address] [facility] [address] [phone]              Who to contact     Please call your clinic at [phone] to:    Ask questions about your health    Make or cancel appointments    Discuss your medicines    Learn about your test results    Speak to your doctor   If you have  "compliments or concerns about an experience at your clinic, or if you wish to file a complaint, please contact [facility] Patient Relations at [phone] or email us at [email or web address]         Additional Information About Your Visit        MyChart Information     Business e via Italyt gives you secure access to your electronic health record. If you see a primary care provider, you can also send messages to your care team and make appointments. If you have questions, please call your primary care clinic.  If you do not have a primary care provider, please call [phone] and they will assist you.      Leadformance is an electronic gateway that provides easy, online access to your medical records. With Leadformance, you can request a clinic appointment, read your test results, renew a prescription or communicate with your care team.     To access your existing account, please contact your [facility] or call [phone] for assistance.        Care EveryWhere ID     This is your Care EveryWhere ID. This could be used by other organizations to access your [place] medical records  [id number]        Your Vitals Were     Height BMI (Body Mass Index)                1.77 m (5' 9.69\") 24.18 kg/m2           Blood Pressure from Last 3 Encounters:   06/13/14 124/81   05/17/12 121/73   05/01/12 111/67    Weight from Last 3 Encounters:   11/17/17 75.8 kg (167 lb)   11/08/17 75.8 kg (167 lb)   10/19/16 74.8 kg (165 lb)              We Performed the Following     REMOVE [facility]        Primary Care Provider Office Phone # Fax #    James GERI Goodwin [phone]       [facility] [address]  [facility] [address]        Equal Access to Services     MISHA ALMODOVAR : Ran Bernabe, baldo curiel, flornia rojas. So [facility] [phone].    ATENCIÓN: Si srinath espisai, " tiene a dupont disposición servicios gratuitos de asistencia lingüística. Maddie blanco [phone].    We comply with applicable federal civil rights laws and [place] laws. We do not discriminate on the basis of race, color, national origin, age, disability, sex, sexual orientation, or gender identity.            Thank you!     Thank you for choosing [facility] EAR NOSE AND TH[facility]AT  for your care. Our goal is always to provide you with excellent care. Hearing back from our patients is one way we can continue to improve our services. Please take a few minutes to complete the written survey that you may receive in the mail after your visit with us. Thank you!             Your Updated Medication List - Protect others around you: Learn how to safely use, store and throw away your medicines at www.disposemymeds.org.          This list is accurate as of: 11/17/17  3:08 PM.  Always use your most recent med list.                   Brand Name Dispense Instructions for use Diagnosis    * AMYLASE CONCENTRATE PO      Take  by mouth.        * DIGESTIVE ENZYMES PO      Take  by mouth.        CURCUMIN           HERBAL ENERGY COMPLEX PO      Take  by mouth.        Iodine Bell           MAG-CAPS OR      Take  by mouth.        MODIFIED CITRUS PECTIN 100-25-2.5 MG Tabs      Take  by mouth.        ofloxacin 0.3 % otic solution    FLOXIN    5 mL    Place 5 drops into both ears 2 times daily for 10 days    Fibromyxosarcoma (H)       UBIQUINOL PO      Take  by mouth.        VITAMIN D3           * Notice:  This list has 2 medication(s) that are the same as other medications prescribed for you. Read the directions carefully, and ask your doctor or other care provider to review them with you.

## 2017-11-20 ENCOUNTER — CARE COORDINATION (OUTPATIENT)
Dept: OTOLARYNGOLOGY | Facility: CLINIC | Age: 62
End: 2017-11-20

## 2017-11-20 NOTE — PROGRESS NOTES
Called patient with MRI neck results. Patient encouraged to call with further questions or concerns.     Impression: No significant change since 6/3/2015. No evidence of  locally recurrent or metastatic fibromyxomasarcoma.     Imani Stack, RN, BSN

## 2017-11-21 ENCOUNTER — PRE VISIT (OUTPATIENT)
Dept: OPHTHALMOLOGY | Facility: CLINIC | Age: 62
End: 2017-11-21

## 2017-11-21 NOTE — TELEPHONE ENCOUNTER
For the evaluation of   Chief Complaint   Patient presents with     Previsit     appt w/ Dr Vergara     Referral     from Dr Jimenez for eye lid eval for possible replacement of weight left eye       When was your last eye exam w/ Dilation? 10 month(s)  Do you wear glasses? No Please bring them with you to your appointment.  Do you wear contacts? No  How many hours per day to you wear your lenses? not applicable  Please bring the boxes with you to your appointment.      HPI:  Location:   OS     Symptoms:   POSITIVE for:  Pertinent Negatives:   Negative for vision changes or eye problems  Severity:   moderate  Duration:   years    Timing:   gradual onset  Other:     -hx of LASIK OU with post-LASIK ectasia   -hx of   -hx of scleral lens use OU      Do you use any eye drops? yes  For what condition(s)? Exposure keratoconjunctivitis, left    Ocular Meds:  ATs       ROS:    Review Of Systems  Eyes: as above  Endocrine:      Allergies:    Allergies   Allergen Reactions     Nkda [No Known Drug Allergies]        Systemic Medications:   Current Outpatient Prescriptions   Medication     Digestive Enzymes (AMYLASE CONCENTRATE PO)     Pectin Cit-Inos-C-Bioflav-Soy (MODIFIED CITRUS PECTIN) 100-25-2.5 MG TABS     Iodine GAVIN     DIGESTIVE ENZYMES PO     UBIQUINOL PO     Turmeric, Curcuma Longa, (CURCUMIN)     Cholecalciferol (VITAMIN D3)     Magnesium Oxide (MAG-CAPS OR)     Misc Natural Products (HERBAL ENERGY COMPLEX PO)     No current facility-administered medications for this visit.        Family History   Problem Relation Age of Onset     Nystagmus No family hx of        Social History   Substance Use Topics     Smoking status: Former Smoker     Packs/day: 0.50     Years: 15.00     Types: Cigarettes     Smokeless tobacco: Never Used     Alcohol use No     [employer]e [employer]. [facility]

## 2017-11-29 ENCOUNTER — OFFICE VISIT (OUTPATIENT)
Dept: OPHTHALMOLOGY | Facility: CLINIC | Age: 62
End: 2017-11-29
Payer: MEDICARE

## 2017-11-29 ENCOUNTER — TELEPHONE (OUTPATIENT)
Dept: OPHTHALMOLOGY | Facility: CLINIC | Age: 62
End: 2017-11-29

## 2017-11-29 DIAGNOSIS — H16.212 EXPOSURE KERATOCONJUNCTIVITIS, LEFT: ICD-10-CM

## 2017-11-29 DIAGNOSIS — H02.234 PARALYTIC LAGOPHTHALMOS OF LEFT UPPER EYELID: Primary | ICD-10-CM

## 2017-11-29 DIAGNOSIS — C49.9 FIBROMYXOSARCOMA (H): ICD-10-CM

## 2017-11-29 DIAGNOSIS — H02.226: ICD-10-CM

## 2017-11-29 DIAGNOSIS — Z90.49 HISTORY OF PAROTIDECTOMY: ICD-10-CM

## 2017-11-29 PROCEDURE — 99203 OFFICE O/P NEW LOW 30 MIN: CPT | Performed by: OPHTHALMOLOGY

## 2017-11-29 PROCEDURE — 92285 EXTERNAL OCULAR PHOTOGRAPHY: CPT | Performed by: OPHTHALMOLOGY

## 2017-11-29 ASSESSMENT — VISUAL ACUITY
OD_SC: 20/25
METHOD: SNELLEN - LINEAR
OD_SC+: -2
CORRECTION_TYPE: CONTACTS
OS_CC: 20/20
OS_CC+: -2

## 2017-11-29 ASSESSMENT — SLIT LAMP EXAM - LIDS: COMMENTS: NORMAL

## 2017-11-29 ASSESSMENT — EXTERNAL EXAM - RIGHT EYE: OD_EXAM: NORMAL

## 2017-11-29 NOTE — MR AVS SNAPSHOT
After Visit Summary   11/29/2017    Chirag Narvaez    MRN: [id number]           Patient Information     Date Of Birth          1955        Visit Information        Provider Department      11/29/2017 12:30 PM Ale Vergara MD [facility]        Today's Diagnoses     Paralytic lagophthalmos of left upper eyelid    -  1    Exposure keratoconjunctivitis, left - Left Eye        Mechanical lagophthalmos, left - Left Eye        History of parotidectomy        Fibromyxosarcoma (H)           Follow-ups after your visit        Your next 10 appointments already scheduled     Dec 13, 2017   Procedure with Ale Vergara MD   [facility] (--)    [address] [facility] [address] [phone]            Feb 22, 2018  1:00 PM CST   (Arrive by 12:45 PM)   Return Visit with Vasquez Romo MD   [facility] Nose [facility] Th[facility] ([facility])    [address] [facility]  [address] [facility] [address] [phone]              Who to contact     If you have questions or need follow up information about today's clinic visit or your schedule please contact [facility] directly at [phone].  Normal or non-critical lab and imaging results will be communicated to you by Excellence Engineeringhart, letter or phone within 4 business days after the clinic has received the results. If you do not hear from us within 7 days, please contact the clinic through MyChart or phone. If you have a critical or abnormal lab result, we will notify you by phone as soon as possible.  Submit refill requests through Funzio or call your pharmacy and they will forward the refill request to us. Please allow 3 business days for your refill to be completed.          Additional Information About Your Visit        Excellence EngineeringharSpowit Information     Funzio gives you secure access to your electronic health record. If you see a primary care provider, you can also send  messages to your care team and make appointments. If you have questions, please call your primary care clinic.  If you do not have a primary care provider, please call [phone] and they will assist you.      Apps Genius is an electronic gateway that provides easy, online access to your medical records. With Apps Genius, you can request a clinic appointment, read your test results, renew a prescription or communicate with your care team.     To access your existing account, please contact your [facility] or call [phone] for assistance.        Care EveryWhere ID     This is your Care EveryWhere ID. This could be used by other organizations to access your [place] medical records  [id number]         Blood Pressure from Last 3 Encounters:   06/13/14 124/81   05/17/12 121/73   05/01/12 111/67    Weight from Last 3 Encounters:   11/17/17 75.8 kg (167 lb)   11/08/17 75.8 kg (167 lb)   10/19/16 74.8 kg (165 lb)              We Performed the Following     External Photos OU (both eyes)     Izabel-Operative Worksheet (Plastics)        Primary Care Provider Office Phone # Fax #    James Marquezdegary [phone]       [facility] [address] [facility] [address]        Equal Access to Services     MISHA ALMODOVAR AH: Hadii aad ku hadasho Soomaali, waaxda luqadaha, qaybta kaalmada adeegyada, waxay idiin hayaan amadeo kharaagata olivera. So [facility] [phone].    ATENCIÓN: Si habla español, tiene a dupont disposición servicios gratuitos de asistencia lingüística. Llame al [phone].    We comply with applicable federal civil rights laws and [place] laws. We do not discriminate on the basis of race, color, national origin, age, disability, sex, sexual orientation, or gender identity.            Thank you!     Thank you for choosing [facility]  for your care. Our goal is always to provide you with excellent care. Hearing back from our patients is one  way we can continue to improve our services. Please take a few minutes to complete the written survey that you may receive in the mail after your visit with us. Thank you!             Your Updated Medication List - Protect others around you: Learn how to safely use, store and throw away your medicines at www.disposemymeds.org.          This list is accurate as of: 11/29/17  5:38 PM.  Always use your most recent med list.                   Brand Name Dispense Instructions for use Diagnosis    * AMYLASE CONCENTRATE PO      Take  by mouth.        * DIGESTIVE ENZYMES PO      Take  by mouth.        CURCUMIN           HERBAL ENERGY COMPLEX PO      Take  by mouth.        Iodine Bell           MAG-CAPS OR      Take  by mouth.        MODIFIED CITRUS PECTIN 100-25-2.5 MG Tabs      Take  by mouth.        UBIQUINOL PO      Take  by mouth.        VITAMIN D3           * Notice:  This list has 2 medication(s) that are the same as other medications prescribed for you. Read the directions carefully, and ask your doctor or other care provider to review them with you.

## 2017-11-29 NOTE — PROGRESS NOTES
Chief Complaints and History of Present Illnesses   Patient presents with     Referral     Dr Jimenez for eye lid eval for possible replacement of weight left eye     Chirag Narvaez is a 62 year old gentleman referred by Dr. Jimenez for evaluation of a poorly positioned upper eyelid weight. 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 He has an LT free flap reconstruction of the lateral aspect of his face. He has had prior lid and brow surgery at [place]. He reports he initially had a 1.4 g weight placed, this was changed out for a 1.8 g weight. Stefanie has a lateral tarsorrhaphy. He reports after these procedures he had constant dry eye symptoms, but since a prose scleral lens was placed by Dr. Friedman his eye has been more comfortable than it had been in years.    He first noticed the eyelid thinning and visible underlying weight several months ago, and has been gradually thinning further. To him it looks like he can see the weight itself, and he is concerned it is a matter of time before it extrudes completely.         Assessment & Plan     Chirag Narvaez is a 62 year old male with the following diagnoses:   1. Paralytic lagophthalmos of left upper eyelid       We discussed options, including observation, and addressing it once or if it does extrude, versus revision and replacement in the supratarsal position. The skin is really quite thin, and a do think it is only a matter of time before the weight extrudes. He is undergoing a procedure on December 13, and hopefully will be able to join to reposition the weight supratarsal position. I will ask that we have an extra 1.8 g weight on hand in case there appears to have been an area of extrusion, otherwise a plan to reuse the same weight.     Plan: reposition weight in supratarsal position         Complete documentation of historical and exam elements from today's encounter can be found in the  full encounter summary report (not reduplicated in this progress note). I personally obtained the chief complaint(s) and history of present illness.  I confirmed and edited as necessary the review of systems, past medical/surgical history, family history, social history, and examination findings as documented by others; and I examined the patient myself. I personally reviewed the relevant tests, images, and reports as documented above. I formulated and edited as necessary the assessment and plan and discussed the findings and management plan with the patient and family. - Ale Vergara MD

## 2017-11-29 NOTE — LETTER
2017         RE:  :  MRN: Chirag Narvaez  1955  [id number]     Dear Dr. Jimenez,    Thank you for asking me to see your patient, Chirag Narvaez, for an oculoplastic   consultation.  My assessment and plan are below.  For further details, please see my attached clinic note.      Chief Complaints and History of Present Illnesses   Patient presents with     Referral       Dr Jimenez for eye lid eval for possible replacement of weight left eye      Chirag Narvaez is a 62 year old gentleman referred by Dr. Jimenez for evaluation of a poorly positioned upper eyelid weight. 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 He has an LT free flap reconstruction of the lateral aspect of his face. He has had prior lid and brow surgery at [place]. He reports he initially had a 1.4 g weight placed, this was changed out for a 1.8 g weight. Stefanie has a lateral tarsorrhaphy. He reports after these procedures he had constant dry eye symptoms, but since a prose scleral lens was placed by Dr. Friedman his eye has been more comfortable than it had been in years.     He first noticed the eyelid thinning and visible underlying weight several months ago, and has been gradually thinning further. To him it looks like he can see the weight itself, and he is concerned it is a matter of time before it extrudes completely.           Assessment & Plan     Chirag Narvaez is a 62 year old male with the following diagnoses:   1. Paralytic lagophthalmos of left upper eyelid       We discussed options, including observation, and addressing it once or if it does extrude, versus revision and replacement in the supratarsal position. The skin is really quite thin, and a do think it is only a matter of time before the weight extrudes. He is undergoing a procedure on , and hopefully will be able to join to reposition the weight supratarsal position. I will ask  that we have an extra 1.8 g weight on hand in case there appears to have been an area of extrusion, otherwise a plan to reuse the same weight.     Plan: reposition weight in supratarsal position        Again, thank you for allowing me to participate in the care of your patient.      Sincerely,    Ale Vergara MD  Department of Ophthalmology and Visual Neurosciences  [facility]    CC: David Jimenze MD  [address]  [facility] [address]  VIA In Basket     Jeannine Friedman, DORINDA  [address] [facility] [address]  [facility] [address]  VIA In Basket     Turner Cevallos MD  [address] [facility] [address]  [facility] [address]  VIA In Basket

## 2017-11-29 NOTE — NURSING NOTE
Patient presents with:  Referral: Dr Jimenez for eye lid eval for possible replacement of weight left eye      Referring Provider:  David Jimenez MD  [address]  [place] [address]    HPI    Symptoms:           Do you have eye pain now?:  No      Comments:  Feels the skin is getting thin nasal side of OS.

## 2017-11-29 NOTE — TELEPHONE ENCOUNTER
Comments:   There is no height or weight on file to calculate BMI. I would likely reposition his current weight, but I would like a 1.8 gram platinum chain available incase this one is not able to be reused.   Questions:    Procedure name(s) - multi select: left upper eyelid weight revision   Is this a multi surgeon case?: Yes Comment - Could be combo with Dr. Jimenez   Comments (who/departments/details):     Laterality: Left   Reason for procedure: paralytic lagophthalmos   Urgency of Surgery: Patient Choice/Next Available   Surgery location: [facility]   Surgeon Procedure Time (incision to closure) in minutes (per procedure as applicable): 30   Patient Class (for admit prior to surgery, specify number of days in comments): Same day (surgery center outpatient)   Anesthesia: Local + MAC   Post-Op Appointment: 1-3 weeks   Vendor Needed?: No

## 2017-11-30 NOTE — TELEPHONE ENCOUNTER
"Patient wanted message to be sent to clinic, \"In January 2008 1.2 gram Eyelid Implant, replaced November 2011 1.6 gram at [facility] by Arias Vela ([facility])\"     "

## 2017-11-30 NOTE — TELEPHONE ENCOUNTER
Date Scheduled: 12-13-17 at 7:00am as combo case with Dr. Jimenez  Facility: [facility]  Surgeon: Dr. Vergara   Post-op appointment scheduled:    scheduled?: No  Surgery packet/instructions confirmed received?  Yes  Special Considerations:   Zëo Sparrow, Surgery Scheduling Coordinator

## 2017-12-01 ENCOUNTER — TRANSFERRED RECORDS (OUTPATIENT)
Dept: HEALTH INFORMATION MANAGEMENT | Facility: CLINIC | Age: 62
End: 2017-12-01

## 2017-12-11 ENCOUNTER — ANESTHESIA EVENT (OUTPATIENT)
Dept: SURGERY | Facility: AMBULATORY SURGERY CENTER | Age: 62
End: 2017-12-11

## 2017-12-12 NOTE — ANESTHESIA PREPROCEDURE EVALUATION
Physical Exam  Normal systems: cardiovascular and pulmonary    Airway   Mallampati: II  TM distance: >3 FB  Neck ROM: full  Comment: Facial defects. Poor mobility and mouth opening on left side of face. No history of tracheostomy.     Dental     Cardiovascular   Rhythm and rate: regular and normal      Pulmonary    breath sounds clear to auscultation                    Anesthesia Plan      History & Physical Review  History and physical reviewed and following examination; no interval change.    ASA Status:  3 .    NPO Status:  > 8 hours    Plan for MAC with Intravenous and Propofol induction. Maintenance will be TIVA.  Reason for MAC:  Procedure to face, neck, head or breast  PONV prophylaxis:  Ondansetron (or other 5HT-3) and Dexamethasone or Solumedrol       Postoperative Care  Postoperative pain management:  IV analgesics, Oral pain medications and Multi-modal analgesia.      Consents  Anesthetic plan, risks, benefits and alternatives discussed with:  Patient..          ANESTHESIA PREOP EVALUATION    PROCEDURE: Procedure(s):  left upper eyelid weight revision, revise scar face - Wound Class:    - Wound Class:     HPI: Chirag Narvaez is a 62 year old male who presents for above procedure.    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     Hearing problem      History of radiation therapy 9/5-10/25/2002    Dose 6.660 cGy     Liver disease 1996     Radiation 2008     Reduced vision      Tinnitus [id number]       PAST SURGICAL HISTORY:    Past Surgical History:   Procedure Laterality Date     BLEPHAROPLASTY, BROW LIFT, COMBINED  2004     Brow lif[  2007     C VAS CLIPS  2003     CL AFF SURGICAL PATHOLOGY      mult tumor removal     GRAFT FREE VASCULARIZED (LOCATION)  4/12/2012    Procedure:GRAFT FREE VASCULARIZED (LOCATION); Left Anterolateral Thigh Free Tissue Transfer;  Surgeon:ERIN PAUL; Location:[facility] OR     IMPLANT GOLD WEIGHT EYELID  2008, 2011     LIVER BIOPSY  1996     PAROTIDECTOMY  1984     PAROTIDECTOMY, RADICAL NECK DISSECTION  4/12/2012    Procedure:PAROTIDECTOMY, RADICAL NECK DISSECTION; Left Radical Parotidectomy with Resection of Over Lying Skin, Facial Nerve Sacrifice, Left Neck Dissection, Left Zygoma Resection; Orthodromic Temporalis Tendon Transfer; Left Anterolateral Thigh Free Tissue Transfer  ; Surgeon:MARINA BRANHAM; Location:[facility] OR     REANIMATION FACE  4/12/2012    Procedure:REANIMATION FACE; Orthodromic temporalis tendon transfer. ; Surgeon:DAVID JIMENEZ; Location:[facility] OR     RECONSTRUCT FOREHEAD  6/13/2014    Procedure: RECONSTRUCT FOREHEAD;  Surgeon: David Jimenez MD;  Location: [facility] OR     REPAIR LID LOWER COSMETIC, REPAIR PTOSIS, COMBINED       REPAIR PTOSIS BROW BILATERAL         PAST ANESTHESIA HISTORY:     No personal or family h/o anesthesia problems    SOCIAL HISTORY:       Social History   Substance Use Topics     Smoking status: Former Smoker     Packs/day: 0.50     Years: 15.00     Types: Cigarettes     Smokeless tobacco: Never Used     Alcohol use No       ALLERGIES:     Allergies   Allergen Reactions     Nkda [No Known Drug Allergies]        MEDICATIONS:       (Not in a hospital admission)    Current Outpatient Prescriptions   Medication Sig Dispense Refill     Digestive Enzymes (AMYLASE CONCENTRATE PO) Take  by mouth.       Pectin Cit-Inos-C-Bioflav-Soy (MODIFIED CITRUS PECTIN) 100-25-2.5 MG TABS Take  by mouth.       Iodine GAVIN        DIGESTIVE ENZYMES PO Take  by mouth.       UBIQUINOL PO Take  by mouth.       Turmeric, Curcuma Longa, (CURCUMIN)        Cholecalciferol (VITAMIN D3)        Magnesium Oxide (MAG-CAPS OR) Take  by mouth.       Misc Natural Products (HERBAL ENERGY COMPLEX PO) Take  by mouth.         Current Outpatient Prescriptions Ordered in [facility]   Medication Sig Dispense Refill     Digestive Enzymes (AMYLASE CONCENTRATE  PO) Take  by mouth.       Pectin Cit-Inos-C-Bioflav-Soy (MODIFIED CITRUS PECTIN) 100-25-2.5 MG TABS Take  by mouth.       Iodine GAVIN        DIGESTIVE ENZYMES PO Take  by mouth.       UBIQUINOL PO Take  by mouth.       Turmeric, Curcuma Longa, (CURCUMIN)        Cholecalciferol (VITAMIN D3)        Magnesium Oxide (MAG-CAPS OR) Take  by mouth.       Misc Natural Products (HERBAL ENERGY COMPLEX PO) Take  by mouth.       No current Epic-ordered facility-administered medications on file.        PHYSICAL EXAM:    Vitals: T Data Unavailable, P Data Unavailable, BP Data Unavailable, R Data Unavailable, SpO2  , Weight Wt Readings from Last 2 Encounters:   11/17/17 75.8 kg (167 lb)   11/08/17 75.8 kg (167 lb)       See doc flowsheet      No Data Recorded    No Data Recorded    No Data Recorded    No Data Recorded    No Data Recorded    No data recorded.  No data recorded.      NPO STATUS: see doc flowsheet    LABS:    BMP:  Recent Labs   Lab Test  06/13/14   1041   NA  140   POTASSIUM  4.1   CHLORIDE  104   CO2  29   BUN  20   CR  0.93   GLC  91   SAMIRA  8.8       LFTs:   No results for input(s): PROTTOTAL, ALBUMIN, BILITOTAL, ALKPHOS, AST, ALT, BILIDIRECT in the last 57962 hours.    CBC:   Recent Labs   Lab Test  06/13/14   1041   WBC  4.2   RBC  4.25*   HGB  13.4   HCT  39.5*   MCV  93   MCH  31.5   MCHC  33.9   RDW  12.3   PLT  170       Coags:  Recent Labs   Lab Test  06/13/14   1041   INR  Specimen not received  Test ordered while patient was being drawn, add on not communicated to   phlebotomist.         Imaging:  No orders to display       Berny Menard MD  Anesthesiology Staff  Pager [phone]    12/11/2017  7:47 PM                    .

## 2017-12-13 ENCOUNTER — SURGERY (OUTPATIENT)
Age: 62
End: 2017-12-13
Payer: MEDICARE

## 2017-12-13 ENCOUNTER — ANESTHESIA (OUTPATIENT)
Dept: SURGERY | Facility: AMBULATORY SURGERY CENTER | Age: 62
End: 2017-12-13
Payer: MEDICARE

## 2017-12-13 ENCOUNTER — HOSPITAL ENCOUNTER (OUTPATIENT)
Facility: AMBULATORY SURGERY CENTER | Age: 62
Discharge: HOME | End: 2017-12-13
Attending: OPHTHALMOLOGY | Admitting: OPHTHALMOLOGY
Payer: MEDICARE

## 2017-12-13 VITALS
OXYGEN SATURATION: 99 % | SYSTOLIC BLOOD PRESSURE: 133 MMHG | TEMPERATURE: 98 F | DIASTOLIC BLOOD PRESSURE: 73 MMHG | RESPIRATION RATE: 16 BRPM

## 2017-12-13 DIAGNOSIS — Z98.890 POSTOPERATIVE EYE STATE: Primary | ICD-10-CM

## 2017-12-13 PROCEDURE — G8916 PT W IV AB GIVEN ON TIME: HCPCS

## 2017-12-13 PROCEDURE — 67912 CORRECTION EYELID W/IMPLANT: CPT | Mod: E1

## 2017-12-13 PROCEDURE — 14060 TIS TRNFR E/N/E/L 10 SQ CM/<: CPT

## 2017-12-13 PROCEDURE — 67912 CORRECTION EYELID W/IMPLANT: CPT | Mod: LT | Performed by: OPHTHALMOLOGY

## 2017-12-13 PROCEDURE — 14061 TIS TRNFR E/N/E/L10.1-30SQCM: CPT | Performed by: OTOLARYNGOLOGY

## 2017-12-13 PROCEDURE — G8907 PT DOC NO EVENTS ON DISCHARG: HCPCS

## 2017-12-13 RX ORDER — CEFAZOLIN SODIUM 2 G/100ML
2 INJECTION, SOLUTION INTRAVENOUS
Status: COMPLETED | OUTPATIENT
Start: 2017-12-13 | End: 2017-12-13

## 2017-12-13 RX ORDER — MUPIROCIN 20 MG/G
OINTMENT TOPICAL PRN
Status: DISCONTINUED | OUTPATIENT
Start: 2017-12-13 | End: 2017-12-13 | Stop reason: HOSPADM

## 2017-12-13 RX ORDER — LIDOCAINE HYDROCHLORIDE 20 MG/ML
INJECTION, SOLUTION INFILTRATION; PERINEURAL PRN
Status: DISCONTINUED | OUTPATIENT
Start: 2017-12-13 | End: 2017-12-13

## 2017-12-13 RX ORDER — CEPHALEXIN 500 MG/1
500 CAPSULE ORAL 2 TIMES DAILY
Qty: 14 CAPSULE | Refills: 0 | Status: SHIPPED | OUTPATIENT
Start: 2017-12-13 | End: 2017-12-20

## 2017-12-13 RX ORDER — PROPOFOL 10 MG/ML
INJECTION, EMULSION INTRAVENOUS PRN
Status: DISCONTINUED | OUTPATIENT
Start: 2017-12-13 | End: 2017-12-13

## 2017-12-13 RX ORDER — SODIUM CHLORIDE, SODIUM LACTATE, POTASSIUM CHLORIDE, CALCIUM CHLORIDE 600; 310; 30; 20 MG/100ML; MG/100ML; MG/100ML; MG/100ML
INJECTION, SOLUTION INTRAVENOUS CONTINUOUS
Status: DISCONTINUED | OUTPATIENT
Start: 2017-12-13 | End: 2017-12-14 | Stop reason: HOSPADM

## 2017-12-13 RX ORDER — TETRACAINE HYDROCHLORIDE 5 MG/ML
SOLUTION OPHTHALMIC PRN
Status: DISCONTINUED | OUTPATIENT
Start: 2017-12-13 | End: 2017-12-13 | Stop reason: HOSPADM

## 2017-12-13 RX ORDER — FENTANYL CITRATE 50 UG/ML
INJECTION, SOLUTION INTRAMUSCULAR; INTRAVENOUS PRN
Status: DISCONTINUED | OUTPATIENT
Start: 2017-12-13 | End: 2017-12-13

## 2017-12-13 RX ORDER — ERYTHROMYCIN 5 MG/G
OINTMENT OPHTHALMIC PRN
Status: DISCONTINUED | OUTPATIENT
Start: 2017-12-13 | End: 2017-12-13 | Stop reason: HOSPADM

## 2017-12-13 RX ORDER — ONDANSETRON 2 MG/ML
4 INJECTION INTRAMUSCULAR; INTRAVENOUS EVERY 30 MIN PRN
Status: DISCONTINUED | OUTPATIENT
Start: 2017-12-13 | End: 2017-12-14 | Stop reason: HOSPADM

## 2017-12-13 RX ORDER — HYDROCODONE BITARTRATE AND ACETAMINOPHEN 5; 325 MG/1; MG/1
1 TABLET ORAL EVERY 6 HOURS PRN
Qty: 12 TABLET | Refills: 0 | Status: SHIPPED | OUTPATIENT
Start: 2017-12-13 | End: 2018-08-08

## 2017-12-13 RX ORDER — ACETAMINOPHEN 325 MG/1
975 TABLET ORAL ONCE
Status: COMPLETED | OUTPATIENT
Start: 2017-12-13 | End: 2017-12-13

## 2017-12-13 RX ORDER — CEFAZOLIN SODIUM 1 G/3ML
1 INJECTION, POWDER, FOR SOLUTION INTRAMUSCULAR; INTRAVENOUS SEE ADMIN INSTRUCTIONS
Status: DISCONTINUED | OUTPATIENT
Start: 2017-12-13 | End: 2017-12-14 | Stop reason: HOSPADM

## 2017-12-13 RX ORDER — ONDANSETRON 4 MG/1
4 TABLET, ORALLY DISINTEGRATING ORAL EVERY 30 MIN PRN
Status: DISCONTINUED | OUTPATIENT
Start: 2017-12-13 | End: 2017-12-14 | Stop reason: HOSPADM

## 2017-12-13 RX ORDER — HYDROMORPHONE HYDROCHLORIDE 1 MG/ML
.3-.5 INJECTION, SOLUTION INTRAMUSCULAR; INTRAVENOUS; SUBCUTANEOUS EVERY 10 MIN PRN
Status: DISCONTINUED | OUTPATIENT
Start: 2017-12-13 | End: 2017-12-14 | Stop reason: HOSPADM

## 2017-12-13 RX ORDER — ERYTHROMYCIN 5 MG/G
OINTMENT OPHTHALMIC
Qty: 3.5 G | Refills: 0 | Status: SHIPPED | OUTPATIENT
Start: 2017-12-13 | End: 2018-08-08

## 2017-12-13 RX ORDER — HYDROCODONE BITARTRATE AND ACETAMINOPHEN 5; 325 MG/1; MG/1
1 TABLET ORAL
Status: DISCONTINUED | OUTPATIENT
Start: 2017-12-13 | End: 2017-12-14 | Stop reason: HOSPADM

## 2017-12-13 RX ORDER — NALOXONE HYDROCHLORIDE 0.4 MG/ML
.1-.4 INJECTION, SOLUTION INTRAMUSCULAR; INTRAVENOUS; SUBCUTANEOUS
Status: DISCONTINUED | OUTPATIENT
Start: 2017-12-13 | End: 2017-12-14 | Stop reason: HOSPADM

## 2017-12-13 RX ORDER — LIDOCAINE 40 MG/G
CREAM TOPICAL
Status: DISCONTINUED | OUTPATIENT
Start: 2017-12-13 | End: 2017-12-14 | Stop reason: HOSPADM

## 2017-12-13 RX ORDER — GABAPENTIN 300 MG/1
300 CAPSULE ORAL ONCE
Status: COMPLETED | OUTPATIENT
Start: 2017-12-13 | End: 2017-12-13

## 2017-12-13 RX ORDER — FENTANYL CITRATE 50 UG/ML
25-50 INJECTION, SOLUTION INTRAMUSCULAR; INTRAVENOUS
Status: DISCONTINUED | OUTPATIENT
Start: 2017-12-13 | End: 2017-12-14 | Stop reason: HOSPADM

## 2017-12-13 RX ORDER — MEPERIDINE HYDROCHLORIDE 25 MG/ML
12.5 INJECTION INTRAMUSCULAR; INTRAVENOUS; SUBCUTANEOUS
Status: DISCONTINUED | OUTPATIENT
Start: 2017-12-13 | End: 2017-12-14 | Stop reason: HOSPADM

## 2017-12-13 RX ORDER — DEXAMETHASONE SODIUM PHOSPHATE 10 MG/ML
10 INJECTION, SOLUTION INTRAMUSCULAR; INTRAVENOUS ONCE
Status: COMPLETED | OUTPATIENT
Start: 2017-12-13 | End: 2017-12-13

## 2017-12-13 RX ADMIN — FENTANYL CITRATE 50 MCG: 50 INJECTION, SOLUTION INTRAMUSCULAR; INTRAVENOUS at 07:45

## 2017-12-13 RX ADMIN — PROPOFOL 50 MG: 10 INJECTION, EMULSION INTRAVENOUS at 08:00

## 2017-12-13 RX ADMIN — CEFAZOLIN SODIUM 2 G: 2 INJECTION, SOLUTION INTRAVENOUS at 07:06

## 2017-12-13 RX ADMIN — TETRACAINE HYDROCHLORIDE 2 DROP: 5 SOLUTION OPHTHALMIC at 07:10

## 2017-12-13 RX ADMIN — PROPOFOL 50 MG: 10 INJECTION, EMULSION INTRAVENOUS at 07:45

## 2017-12-13 RX ADMIN — DEXAMETHASONE SODIUM PHOSPHATE 10 MG: 10 INJECTION, SOLUTION INTRAMUSCULAR; INTRAVENOUS at 07:09

## 2017-12-13 RX ADMIN — LIDOCAINE HYDROCHLORIDE 40 MG: 20 INJECTION, SOLUTION INFILTRATION; PERINEURAL at 07:06

## 2017-12-13 RX ADMIN — Medication 3 ML: at 08:30

## 2017-12-13 RX ADMIN — Medication 1 ML: at 07:28

## 2017-12-13 RX ADMIN — PROPOFOL 50 MG: 10 INJECTION, EMULSION INTRAVENOUS at 07:30

## 2017-12-13 RX ADMIN — ERYTHROMYCIN 1 G: 5 OINTMENT OPHTHALMIC at 08:33

## 2017-12-13 RX ADMIN — SODIUM CHLORIDE, SODIUM LACTATE, POTASSIUM CHLORIDE, CALCIUM CHLORIDE: 600; 310; 30; 20 INJECTION, SOLUTION INTRAVENOUS at 06:51

## 2017-12-13 RX ADMIN — FENTANYL CITRATE 50 MCG: 50 INJECTION, SOLUTION INTRAMUSCULAR; INTRAVENOUS at 07:06

## 2017-12-13 RX ADMIN — MUPIROCIN 1 G: 20 OINTMENT TOPICAL at 08:37

## 2017-12-13 RX ADMIN — ACETAMINOPHEN 975 MG: 325 TABLET ORAL at 06:30

## 2017-12-13 RX ADMIN — GABAPENTIN 300 MG: 300 CAPSULE ORAL at 06:30

## 2017-12-13 RX ADMIN — SODIUM CHLORIDE, SODIUM LACTATE, POTASSIUM CHLORIDE, CALCIUM CHLORIDE: 600; 310; 30; 20 INJECTION, SOLUTION INTRAVENOUS at 07:06

## 2017-12-13 RX ADMIN — PROPOFOL 50 MG: 10 INJECTION, EMULSION INTRAVENOUS at 07:09

## 2017-12-13 NOTE — ANESTHESIA POSTPROCEDURE EVALUATION
Patient: Chirag Narvaez    Procedure(s):  left upper eyelid weight revision, revise scar face - Wound Class: I-Clean   - Wound Class: I-Clean    Diagnosis:paralytic lagophthalmos, facial tumor  Diagnosis Additional Information: No value filed.    Anesthesia Type:  MAC    Note:  Anesthesia Post Evaluation    Patient location during evaluation: Phase 2  Patient participation: Able to fully participate in evaluation  Level of consciousness: awake and alert  Pain management: adequate  Airway patency: patent  Cardiovascular status: acceptable  Respiratory status: acceptable  Hydration status: acceptable  PONV: none     Anesthetic complications: None          Last vitals:  Vitals:    12/13/17 0900 12/13/17 0915 12/13/17 0930   BP: 130/69 127/73 133/73   Resp: 16 16 16   Temp:   98  F (36.7  C)   SpO2: 99% 98% 99%         Electronically Signed By: Berny Menard MD  December 13, 2017

## 2017-12-13 NOTE — IP AVS SNAPSHOT
[facility] [place]    [address] [place] [address]    Phone:  [phone]                                       After Visit Summary   12/13/2017    Chirag Narvaez    MRN: [id number]           After Visit Summary Signature Page     I have received my discharge instructions, and my questions have been answered. I have discussed any challenges I see with this plan with the nurse or doctor.    ..........................................................................................................................................  Patient/Patient Representative Signature      ..........................................................................................................................................  Patient Representative Print Name and Relationship to Patient    ..................................................               ................................................  Date                                            Time    ..........................................................................................................................................  Reviewed by Signature/Title    ...................................................              ..............................................  Date                                                            Time

## 2017-12-13 NOTE — OP NOTE
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  PROCEDURE: Left  upper eyelid 1.8 gram platinum weight removal, and replacement in a supratarsal position  ANESTHESIA: Monitored with local infiltration of a 50/50 mixture of 2% lidocaine with epinephrine and 0.5% Marcaine.   SURGEON: Ale Vergara MD  Please see separate note by Dr. Jimenez*   COMPLICATIONS: None.   ESTIMATED BLOOD LOSS: Less than 5 mL.   SPECIMENS: None.   HISTORY AND INDICATIONS: Chirag Narvaez  presented with a left  facial paralysis with lagophthalmos with exposure keratitis. He had a prior platinum weight placed pretarsally, and it was eroding through his pretarsal skin.  After risks, benefits and alternatives to the proposed procedure were explained, informed consent was obtained.   DESCRIPTION OF PROCEDURE: Chirag Narvaez  was brought to the operating room and placed supine on the operating table. IV sedation was given. The left upper eyelid crease was marked with a marking pen and infiltrated with local anesthetic. The site was prepped and draped in the typical sterile ophthalmic fashion. Attention was directed to the left  side. Lid crease incision was made with a 15 blade and dissection carried down through the orbicularis with high temperature cautery. The previously placed pretarsal weight was carefully dissected out. It was cleaned and placed in gentamicin irrigation. Orbital septum was opened horizontally and levator aponeurosis was dissected from the superior tarsal border. The same 1.8 gram platinum chain was again used. It was secured to the superior tarsal border medially and laterally with 5-0 Mersilene suture that was placed through the predrilled holes in the weight. The superior hole was secured to the levator aponeurosis with 5-0 Mersilene suture. Contour was checked and  found to be good. Orbicularis layer was closed with interrupted buried 6-0 Vicryl sutures. Skin was closed with running 6-0 plain gut suture. Erythromycin ointment was applied to the eye in the incision. Chirag Palaciosroya  tolerated the procedure well.  At this point Dr Jimenez proceeded with his portion of the case.   Ale Vergara MD

## 2017-12-13 NOTE — PROGRESS NOTES
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   4/12/12: resection of recurrent left cheek fibromyxosarcoma. Proximal facial nerve branches were not available so nerve grafting was not performed. S/P orthodromic temporalis tendon transfer with fascia demetria sling. S/p ALT free flap by Dr. Tyler. Has soft tissue contour deformity due to fascial attachment at left melolabial fold.   6/13/14: s/p left detachment of dermis from underlying perioral tendon (path= no residual tumor).  12/13/17: stage 3 reconstruction of his superior melolabial crease with tissue excision and inferiorly based upper lip advancement rotation flap to address the fold of his left upper lip from redundant skin.   2) Left cheek and soft tissue defect from recurrent fibromyxosarcoma excision s/p ALT free flap by Dr. Tyler 4/12/12. Has soft tissue deformity due to tissue mismatch at the anterior cheek margin and jawline.  6/13/14: s/p left ALT flap contouring of jawline and anterior cheek (path= no residual tumor).  3) Detached left earlobe.  6/13/14: s/p left earlobe reattachment.   Comorbidities: None   Pertinent medications: None   Checklist:   _Path from 12/13/17.  _RTC 1 week for suture removal.     David Jimenez

## 2017-12-13 NOTE — ANESTHESIA CARE TRANSFER NOTE
Patient: Chirag Narvaez    Procedure(s):  left upper eyelid weight revision, revise scar face - Wound Class: I-Clean   - Wound Class: I-Clean    Diagnosis: paralytic lagophthalmos, facial tumor  Diagnosis Additional Information: No value filed.    Anesthesia Type:   MAC     Note:  Airway :Room Air  Patient transferred to:Phase II  Comments: Care of Transfer report given to RN.  Spont Respirations. Responds   Easy.Handoff Report: Identifed the Patient, Identified the Reponsible Provider, Reviewed the pertinent medical history, Discussed the surgical course, Reviewed Intra-OP anesthesia mangement and issues during anesthesia, Set expectations for post-procedure period and Allowed opportunity for questions and acknowledgement of understanding      Vitals: (Last set prior to Anesthesia Care Transfer)    CRNA VITALS  12/13/2017 0813 - 12/13/2017 0845      12/13/2017             Pulse: 50    SpO2: 100 %    Resp Rate (observed): 12                Electronically Signed By: TAMERA AUSTIN CRNA  December 13, 2017  8:45 AM

## 2017-12-13 NOTE — OP NOTE
DATE OF SERVICE:  12/13/2017.      ATTENDING SURGEON:  David Jimenez MD      PREOPERATIVE DIAGNOSES:   1.  Left facial paralysis.   2.  Left upper lip and melolabial crease abnormal soft tissue contour deformity.      POSTOPERATIVE DIAGNOSES:   1.  Left facial paralysis.   2.  Left upper lip and melolabial crease abnormal soft tissue contour deformity.      PROCEDURES:  Stage III left facial reconstruction consisting of:   1.  Excision of left melolabial crease tissue 40 x 5 mm.   2.  Adjacent tissue transfer of left upper lip skin using an inferiorly-based advancement rotation flap 40 x 25 mm to reconstruct the 40 x 5 mm left melolabial wound.      OPERATIVE FINDINGS:  Abnormal crease of the left upper lip due to scar tethering of the underlying tissue and descent of his mid face with absence of a melolabial crease on the left side.  This was corrected with advancement and rotation of his upper lip skin and release of the underlying scar tissue.      INDICATIONS:  Mr. Chirag Narvaez is a 62-year-old gentleman well known to me with longstanding facial paralysis from a cancer excision.  He has undergone orthodromic temporalis tendon transfer.  He returned to see me to discuss any potential improvements in his facial contour.  I offered to correct the redundant skin of his left upper lip, resulting in an abnormal soft tissue contour.  Again, in the preoperative area, we reviewed how this is a very minor change.  He again wanted to proceed with surgery.  An extensive preoperative discussion was held.  The patient stated he understood the risks, benefits, alternatives and limitations of the procedure.  He also stated he had his questions answered to his satisfaction.  He wished to proceed with surgery.      DESCRIPTION OF PROCEDURE:  After informed consent was obtained, I showed the markings of the intended surgery in the mirror to the patient.  He approved this plan.  He then went to the operating room with   Ursula from the oculoplastic service where he underwent eyelid weight exchange.  This is documented separately.  After Dr. Vergara was done with his portion of the procedure, I repeated a timeout.  The patient was already prepped and draped.  Lidocaine 1% with 1:100,000 epinephrine and Marcaine was injected into the patient at the proposed operative site.  A 15-blade scalpel was used to incise through the intended location of his reconstructed left melolabial crease.  Dissection continued down to the subcutaneous fat.  A fusiform segment of melolabial crease skin was then excised with a curved iris scissors to allow lip elevation.  This tissue sent for routine pathology.      A flap was then developed to reconstruct this melolabial crease wound.  The flap was undermined in the subcutaneous plane with a 15-blade scalpel.  An additional incision was made along his nasofacial sulcus on the left side.  The dissection in the subcutaneous plane continued until reaching the scar banding and this was released sharply.  Complete hemostasis was obtained with the bipolar cautery.  The flap was then advanced superiorly and rotated medially.  This resulted in resolution of the excess tissue and the folding on his upper lip.  The flap was then inset using multiple interrupted 4-0 Vicryl sutures in the dermal layer and running subcuticular 5-0 Prolene sutures in the superficial layer.  A light pressure dressing was then applied in the recovery room.  The patient was returned to the care of the anesthesia service in stable condition.      PLAN:  Postoperative written and verbal instructions will be given in detail.  He will follow up next week for suture removal.  He has my direct number to call if he has questions or problems arise.         JEFFERY KENDALL MD             D: 12/13/2017 09:02   T: 12/13/2017 10:19   MT: ALEX      Name:     DOTTY FRANCISCO   MRN:      [id number]        Account:        [id number]    :      1955           Procedure Date: 2017      Document: [id number]       cc: David Jimenez MD

## 2017-12-13 NOTE — DISCHARGE INSTRUCTIONS
"[facility]  Same-Day Surgery   Adult Discharge Orders & Instructions   For 24 hours after surgery  1. Get plenty of rest.  A responsible adult must stay with you for at least 24 hours after you leave the hospital.   2. Do not drive or use heavy equipment.  If you have weakness or tingling, don't drive or use heavy equipment until this feeling goes away.  3. Do not drink alcohol.  4. Avoid strenuous or risky activities.  Ask for help when climbing stairs.   5. You may feel lightheaded.  IF so, sit for a few minutes before standing.  Have someone help you get up.   6. If you have nausea (feel sick to your stomach): Drink only clear liquids such as apple juice, ginger ale, broth or 7-Up.  Rest may also help.  Be sure to drink enough fluids.  Move to a regular diet as you feel able.  7. You may have a slight fever. Call the doctor if your fever is over 100 F (37.7 C) (taken under the tongue) or lasts longer than 24 hours.  8. You may have a dry mouth, a sore throat, muscle aches or trouble sleeping.  These should go away after 24 hours.  9. Do not make important or legal decisions.   Call your doctor for any of the followin.  Signs of infection (fever, growing tenderness at the surgery site, a large amount of drainage or bleeding, severe pain, foul-smelling drainage, redness, swelling).  2. It has been over 8 to 10 hours since surgery and you are still not able to urinate (pass water).  3.  Headache for over 24 hours.        Please review Dr. Jimenez's Discharge Packet \"Patient Instructions for Facial Surgery.\"      Follow up with Dr. Jimenez in about 1 week at [facility]. Call [phone] to make an appointment.    Please contact Dr. Jimenez directly on his cell phone [phone] if you have questions or concerns.      Post-operative Instructions  Ophthalmic Plastic and Reconstructive Surgery    Ale Vergara M.D.     All instructions apply to the operated eye(s) " or eyelid(s).    Wound care and personal care  ? Apply ice compresses 15 minutes on 15 minutes off while awake for 2 days, then switch to warm water compresses 4 times a day until seen by your physician. For warm packs you can place a cup of dry uncooked rice in a clean cotton sock. Then place sock in microwave 30 seconds to one minute. Next place the warm sock into a plastic bag and wrap the bag with clean warm wet washcloth and place over operated eye.    ? You may shower or wash your hair the day after surgery. Do not bathe or go swimming for 1 week to prevent contamination of your wounds.  ? Do not apply make-up to the eyes or eyelids for 2 weeks after surgery.  ? Expect some swelling, bruising, black eye (even into the lower eyelids and cheeks). Also expect serum caking, crusting and discharge from the eye and/or incisions. A small amount of surface bleeding is normal for the first 48 hours.  ? Your eye(s) and eyelid(s) may be painful and tender. This is normal after surgery.  Use the pain medication as prescribed. If your pain does not improve despite the  medication, contact the office.    Contact information and follow-up  ? Return to the Eye Clinic for a follow-up appointment with your physician as  scheduled. If no appointment has been scheduled:   - [facility] eye clinic: [phone] for an appointment with Dr. Vergara within 1 to 2 weeks from your date of surgery.   -  [facility] eye clinic: [phone] for an appointment with Dr. Vergara within 1 to 2 weeks from your date of surgery.     ? For severe pain, bleeding, or loss of vision, call the [facility] at [phone] or [facility] at [phone].     After hours or on weekends and holidays, call [phone] and ask to speak with the ophthalmologist on call.    An on call person can be reached after hours for concerns. The on call doctor should not call in medication refill  requests after hours or on weekends, so please plan accordingly. An effort has been made to provide adequate pain medications following every surgery, and refills will not be provided in most instances. Narcotic pain medications cannot be called in.     Activity restrictions and driving  ? Avoid heavy lifting, bending, exercise or strenuous activity for 1 week after surgery.  You may resume other activities and return to work as tolerated.  ? You may not resume driving until have you stopped using narcotic pain medications (such as Norco, Percocet, Tylenol #3).    Medications  ? Restart all your regular home medications and eye drops. If you take Plavix or  Aspirin on a regular basis, wait for 72 hours after your surgery before restarting these in order to decrease the risk of bleeding complications.  ? Avoid aspirin and aspirin-like medications (Motrin, Aleve, Ibuprofen, Nilda-  [place] etc) for 72 hours to reduce the risk of bleeding. You may take Tylenol  (acetaminophen) for pain.  ? In addition to your home medications, take the following post-operative medications as prescribed by your physician.    ? Apply antibiotic ointment to all sutures three times a day, and into the operated eye(s) at night. Continue all of your home eye drops.  ? Take antibiotic capsules or tablets as directed.  ? Take pain pills at prescribed frequency as needed for pain.    ? WARNING: All the prescription pain medications listed above contain Tylenol  (acetaminophen). You must not take more than 4,000 mg of acetaminophen per  24-hour period. This is equivalent to 6 tablets of Darvocet, 12 tablets of Norco, Percocet or Tylenol #3. If you take other over-the-counter medications containing acetaminophen, you must take the amount of acetaminophen into account and reduce the number of prescribed pain pills accordingly.  ? The prescribed medications may make you drowsy. You must not drive a car,  operate heavy machinery or drink alcohol  while taking them.  ? The prescribed pain medications may cause constipation and nausea. Take them  with some food to prevent a stomach upset. If you continue to experience nausea,  call your physician.

## 2017-12-13 NOTE — BRIEF OP NOTE
Brief Operative Note    Pre-operative diagnosis: Left facial paralysis   Post-operative diagnosis: Same   Procedure: Stage 3 left melolabial reconstruction with tissue excision, local flap   Surgeon: David Jimenez   Assistant(s): None   Anesthesia: Conscious sedation   Estimated blood loss: Minimal   Total IV fluids: (See anesthesia record)   Blood transfusion: No transfusion was given during surgery   Total urine output: (See anesthesia record)   Drains: None   Specimens: Left melolabial crease skin   Implants: None   Findings: Upper lip fold from redundant skin.    Complications: None.   Condition: Stable.   Comments: See operative report for full details

## 2017-12-13 NOTE — IP AVS SNAPSHOT
MRN:[id number]                      After Visit Summary   12/13/2017    Chirag Narvaez    MRN: [id number]           Thank you!     Thank you for choosing [facility] for your care. Our goal is always to provide you with excellent care. Hearing back from our patients is one way we can continue to improve our services. Please take a few minutes to complete the written survey that you may receive in the mail after you visit with us. Thank you!        Patient Information     Date Of Birth          1955        About your hospital stay     You were admitted on:  December 13, 2017 You last received care in the:  [facility]    You were discharged on:  December 13, 2017       Who to Call     For medical emergencies, please call 911.  For non-urgent questions about your medical care, please call your primary care provider or clinic, [phone]  For questions related to your surgery, please call your surgery clinic        Attending Provider     Provider Specialty    Ale Vergara MD Ophthalmology       Primary Care Provider Office Phone # Fax #    James Goodwin [phone]      After Care Instructions     Discharge Medication Instructions       Do NOT take aspirin or medications containing NSAIDS for 72 hours after procedure.            Ice to affected area       Apply cold pack for 15 minutes on, 15 minutes off, for 48 hours while awake.                  Your next 10 appointments already scheduled     Feb 22, 2018  1:00 PM CST   (Arrive by 12:45 PM)   Return Visit with Vasquez Romo MD   [facility] ([facility])    [address] [place] [id number] [phone]              Further instructions from your care team       [facility]  Same-Day Surgery   Adult Discharge Orders & Instructions   For 24 hours after surgery  1. Get plenty of rest.  A responsible  "adult must stay with you for at least 24 hours after you leave the hospital.   2. Do not drive or use heavy equipment.  If you have weakness or tingling, don't drive or use heavy equipment until this feeling goes away.  3. Do not drink alcohol.  4. Avoid strenuous or risky activities.  Ask for help when climbing stairs.   5. You may feel lightheaded.  IF so, sit for a few minutes before standing.  Have someone help you get up.   6. If you have nausea (feel sick to your stomach): Drink only clear liquids such as apple juice, ginger ale, broth or 7-Up.  Rest may also help.  Be sure to drink enough fluids.  Move to a regular diet as you feel able.  7. You may have a slight fever. Call the doctor if your fever is over 100 F (37.7 C) (taken under the tongue) or lasts longer than 24 hours.  8. You may have a dry mouth, a sore throat, muscle aches or trouble sleeping.  These should go away after 24 hours.  9. Do not make important or legal decisions.   Call your doctor for any of the followin.  Signs of infection (fever, growing tenderness at the surgery site, a large amount of drainage or bleeding, severe pain, foul-smelling drainage, redness, swelling).  2. It has been over 8 to 10 hours since surgery and you are still not able to urinate (pass water).  3.  Headache for over 24 hours.        Please review Dr. Jimenez's Discharge Packet \"Patient Instructions for Facial Surgery.\"      Follow up with Dr. Jimenez in about 1 week at [facility]. Call [phone] to make an appointment.    Please contact Dr. Jimenez directly on his cell phone [phone] if you have questions or concerns.      Post-operative Instructions  Ophthalmic Plastic and Reconstructive Surgery    Ale Vergara M.D.     All instructions apply to the operated eye(s) or eyelid(s).    Wound care and personal care  ? Apply ice compresses 15 minutes on 15 minutes off while awake for 2 days, then switch to warm water compresses 4 " times a day until seen by your physician. For warm packs you can place a cup of dry uncooked rice in a clean cotton sock. Then place sock in microwave 30 seconds to one minute. Next place the warm sock into a plastic bag and wrap the bag with clean warm wet washcloth and place over operated eye.    ? You may shower or wash your hair the day after surgery. Do not bathe or go swimming for 1 week to prevent contamination of your wounds.  ? Do not apply make-up to the eyes or eyelids for 2 weeks after surgery.  ? Expect some swelling, bruising, black eye (even into the lower eyelids and cheeks). Also expect serum caking, crusting and discharge from the eye and/or incisions. A small amount of surface bleeding is normal for the first 48 hours.  ? Your eye(s) and eyelid(s) may be painful and tender. This is normal after surgery.  Use the pain medication as prescribed. If your pain does not improve despite the  medication, contact the office.    Contact information and follow-up  ? Return to the Eye Clinic for a follow-up appointment with your physician as  scheduled. If no appointment has been scheduled:   - [facility] eye clinic: [phone] for an appointment with Dr. Vergara within 1 to 2 weeks from your date of surgery.   -  [facility] eye clinic: [phone] for an appointment with Dr. Vergara within 1 to 2 weeks from your date of surgery.     ? For severe pain, bleeding, or loss of vision, call the [facility] Eye Clinic at [phone] or [facility] at [phone].     After hours or on weekends and holidays, call [phone] and ask to speak with the ophthalmologist on call.    An on call person can be reached after hours for concerns. The on call doctor should not call in medication refill requests after hours or on weekends, so please plan accordingly. An effort has been made to provide adequate pain medications following every surgery, and refills  will not be provided in most instances. Narcotic pain medications cannot be called in.     Activity restrictions and driving  ? Avoid heavy lifting, bending, exercise or strenuous activity for 1 week after surgery.  You may resume other activities and return to work as tolerated.  ? You may not resume driving until have you stopped using narcotic pain medications (such as Norco, Percocet, Tylenol #3).    Medications  ? Restart all your regular home medications and eye drops. If you take Plavix or  Aspirin on a regular basis, wait for 72 hours after your surgery before restarting these in order to decrease the risk of bleeding complications.  ? Avoid aspirin and aspirin-like medications (Motrin, Aleve, Ibuprofen, Nilda-  [place] etc) for 72 hours to reduce the risk of bleeding. You may take Tylenol  (acetaminophen) for pain.  ? In addition to your home medications, take the following post-operative medications as prescribed by your physician.    ? Apply antibiotic ointment to all sutures three times a day, and into the operated eye(s) at night. Continue all of your home eye drops.  ? Take antibiotic capsules or tablets as directed.  ? Take pain pills at prescribed frequency as needed for pain.    ? WARNING: All the prescription pain medications listed above contain Tylenol  (acetaminophen). You must not take more than 4,000 mg of acetaminophen per  24-hour period. This is equivalent to 6 tablets of Darvocet, 12 tablets of Norco, Percocet or Tylenol #3. If you take other over-the-counter medications containing acetaminophen, you must take the amount of acetaminophen into account and reduce the number of prescribed pain pills accordingly.  ? The prescribed medications may make you drowsy. You must not drive a car,  operate heavy machinery or drink alcohol while taking them.  ? The prescribed pain medications may cause constipation and nausea. Take them  with some food to prevent a stomach upset. If you continue to  "experience nausea,  call your physician.      Pending Results     No orders found from 12/11/2017 to 12/14/2017.            Admission Information     Date & Time Provider Department Dept. Phone    12/13/2017 Ale Vergara MD [facility] [phone]      Your Vitals Were     Blood Pressure Temperature Respirations Pulse Oximetry          127/73 97.8  F (36.6  C) (Temporal) 16 98%        MyChart Information     [employer] gives you secure access to your electronic health record. If you see a primary care provider, you can also send messages to your care team and make appointments. If you have questions, please call your primary care clinic.  If you do not have a primary care provider, please call [phone] and they will assist you.        Care EveryWhere ID     This is your Care EveryWhere ID. This could be used by other organizations to access your [facility] medical records  [id number]        Equal Access to Services     MISHA ALMODOVAR : Hadii leila Bernabe, wajasmin curiel, qaariela kaalmatayler mckeon, florina mccullough . So [facility] [phone].    ATENCIÓN: Si habla español, tiene a dupont disposición servicios gratuitos de asistencia lingüística. Maddie al [phone].    We comply with applicable federal civil rights laws and [place] laws. We do not discriminate on the basis of race, color, national origin, age, disability, sex, sexual orientation, or gender identity.               Review of your medicines      START taking        Dose / Directions    cephALEXin 500 MG capsule   Commonly known as:  KEFLEX   Used for:  Postoperative eye state        Dose:  500 mg   Take 1 capsule (500 mg) by mouth 2 times daily for 7 days   Quantity:  14 capsule   Refills:  0       erythromycin ophthalmic ointment   Commonly known as:  ROMYCIN   Used for:  Postoperative eye state        Apply small amount to incision sites three times a day and 1/2\" strip into operated eye at " bedtime   Quantity:  3.5 g   Refills:  0       HYDROcodone-acetaminophen 5-325 MG per tablet   Commonly known as:  NORCO   Used for:  Postoperative eye state        Dose:  1 tablet   Take 1 tablet by mouth every 6 hours as needed for pain Maximum of 4000 mg of acetaminophen in 24 hours.   Quantity:  12 tablet   Refills:  0         CONTINUE these medicines which have NOT CHANGED        Dose / Directions    * AMYLASE CONCENTRATE PO        Take  by mouth.   Refills:  0       * DIGESTIVE ENZYMES PO        Take  by mouth.   Refills:  0       CURCUMIN        Refills:  0       HERBAL ENERGY COMPLEX PO        Take  by mouth.   Refills:  0       Iodine Bell        Refills:  0       MAG-CAPS OR        Take  by mouth.   Refills:  0       MODIFIED CITRUS PECTIN 100-25-2.5 MG Tabs        Take  by mouth.   Refills:  0       UBIQUINOL PO        Take  by mouth.   Refills:  0       VALTREX PO   Indication:  Shingles        Dose:  50 mg   Take 50 mg by mouth   Refills:  0       VITAMIN D3        Refills:  0       * Notice:  This list has 2 medication(s) that are the same as other medications prescribed for you. Read the directions carefully, and ask your doctor or other care provider to review them with you.         Where to get your medicines      These medications were sent to [facility] - [place], [place] - [address], [address]  [address], [address], [facility] [address]     Phone:  [phone]     cephALEXin 500 MG capsule    erythromycin ophthalmic ointment         Some of these will need a paper prescription and others can be bought over the counter. Ask your nurse if you have questions.     Bring a paper prescription for each of these medications     HYDROcodone-acetaminophen 5-325 MG per tablet               ANTIBIOTIC INSTRUCTION     You've Been Prescribed an Antibiotic - Now What?  Your healthcare team thinks that you or your loved one might have an infection. Some infections can be  treated with antibiotics, which are powerful, life-saving drugs. Like all medications, antibiotics have side effects and should only be used when necessary. There are some important things you should know about your antibiotic treatment.      Your healthcare team may run tests before you start taking an antibiotic.    Your team may take samples (e.g., from your blood, urine or other areas) to run tests to look for bacteria. These test can be important to determine if you need an antibiotic at all and, if you do, which antibiotic will work best.      Within a few days, your healthcare team might change or even stop your antibiotic.    Your team may start you on an antibiotic while they are working to find out what is making you sick.    Your team might change your antibiotic because test results show that a different antibiotic would be better to treat your infection.    In some cases, once your team has more information, they learn that you do not need an antibiotic at all. They may find out that you don't have an infection, or that the antibiotic you're taking won't work against your infection. For example, an infection caused by a virus can't be treated with antibiotics. Staying on an antibiotic when you don't need it is more likely to be harmful than helpful.      You may experience side effects from your antibiotic.    Like all medications, antibiotics have side effects. Some of these can be serious.    Let you healthcare team know if you have any known allergies when you are admitted to the hospital.    One significant side effect of nearly all antibiotics is the risk of severe and sometimes deadly diarrhea caused by Clostridium difficile (C. Difficile). This occurs when a person takes antibiotics because some good germs are destroyed. Antibiotic use allows C. diificile to take over, putting patients at high risk for this serious infection.    As a patient or caregiver, it is important to understand your or  "your loved one's antibiotic treatment. It is especially important for caregivers to speak up when patients can't speak for themselves. Here are some important questions to ask your healthcare team.    What infection is this antibiotic treating and how do you know I have that infection?    What side effects might occur from this antibiotic?    How long will I need to take this antibiotic?    Is it safe to take this antibiotic with other medications or supplements (e.g., vitamins) that I am taking?     Are there any special directions I need to know about taking this antibiotic? For example, should I take it with food?    How will I be monitored to know whether my infection is responding to the antibiotic?    What tests may help to make sure the right antibiotic is prescribed for me?      Information provided by:  www.cdc.gov/getsmart  U.S. Department of Health and Human Services  Centers for disease Control and Prevention  National Center for Emerging and Zoonotic Infectious Diseases  Division of Healthcare Quality Promotion         Protect others around you: Learn how to safely use, store and throw away your medicines at www.disposemymeds.org.             Medication List: This is a list of all your medications and when to take them. Check marks below indicate your daily home schedule. Keep this list as a reference.      Medications           Morning Afternoon Evening Bedtime As Needed    * AMYLASE CONCENTRATE PO   Take  by mouth.                                * DIGESTIVE ENZYMES PO   Take  by mouth.                                cephALEXin 500 MG capsule   Commonly known as:  KEFLEX   Take 1 capsule (500 mg) by mouth 2 times daily for 7 days                                CURCUMIN                                erythromycin ophthalmic ointment   Commonly known as:  ROMYCIN   Apply small amount to incision sites three times a day and 1/2\" strip into operated eye at bedtime   Last time this was given:  1 g on " 12/13/2017  8:33 AM                                HERBAL ENERGY COMPLEX PO   Take  by mouth.                                HYDROcodone-acetaminophen 5-325 MG per tablet   Commonly known as:  NORCO   Take 1 tablet by mouth every 6 hours as needed for pain Maximum of 4000 mg of acetaminophen in 24 hours.                                Iodine Bell                                MAG-CAPS OR   Take  by mouth.                                MODIFIED CITRUS PECTIN 100-25-2.5 MG Tabs   Take  by mouth.                                UBIQUINOL PO   Take  by mouth.                                VALTREX PO   Take 50 mg by mouth                                VITAMIN D3                                * Notice:  This list has 2 medication(s) that are the same as other medications prescribed for you. Read the directions carefully, and ask your doctor or other care provider to review them with you.

## 2017-12-14 ENCOUNTER — TELEPHONE (OUTPATIENT)
Dept: OTOLARYNGOLOGY | Facility: CLINIC | Age: 62
End: 2017-12-14

## 2017-12-15 ENCOUNTER — TELEPHONE (OUTPATIENT)
Dept: OTOLARYNGOLOGY | Facility: CLINIC | Age: 62
End: 2017-12-15

## 2017-12-15 NOTE — TELEPHONE ENCOUNTER
Received message from the patient asking me to call him back to schedule a coordinated appointment with Dr. Jimenez and Dr. Vergara. He also mentioned that he does not like mornings and that afternoon appointment was requested. I called the patient back and received his voicemail. I relayed that we would not be able to do a coordinated appointment as Dr. Jimenez is not here on a Wednesday until the middle of January and that Dr. Vergara does not come into [facility] on Thursdays. I did also relay that Dr. Jimenez needs to see the patient in 1 week due to the sutures, but that Dr. Vergara wants to see the patient at 2-3 weeks post op. I left my direct number to have the patient call me directly to get the appointments scheduled.   Zoë Sparrow, Surgery Scheduling Coordinator

## 2017-12-15 NOTE — TELEPHONE ENCOUNTER
Contacted to assist patient with appt with Dr Jimenez sooner than 12/28 appt given recent post op procedure 12/13. Appt made with Dr Jimenez on 12/21 at 1015. Appt made also with Dr Vergara on 1/3 at 1:15. ENT staff notified.

## 2017-12-15 NOTE — TELEPHONE ENCOUNTER
[facility] Call Center    Phone Message    Name of Caller: Chirag    Phone Number: Home number on file [phone] (home)    Best time to return call: Any    May a detailed message be left on voicemail: yes    Relation to patient: Self    Reason for Call: Other: Chirag would like a call back to schedule a post op appt. He had a procedure on 12/13 and needs to do a 1 week follow up. Gave him Dr. Jimenez's first available which is 12/28 but he is not happy with that. He would like a call to discuss. Please advise.      Action Taken: Message routed to:  Adult Clinics: ENT p [id number]

## 2017-12-18 LAB — COPATH REPORT: NORMAL

## 2017-12-21 ENCOUNTER — OFFICE VISIT (OUTPATIENT)
Dept: OTOLARYNGOLOGY | Facility: CLINIC | Age: 62
End: 2017-12-21
Payer: MEDICARE

## 2017-12-21 DIAGNOSIS — C49.9 FIBROMYXOSARCOMA (H): Primary | ICD-10-CM

## 2017-12-21 PROCEDURE — 99024 POSTOP FOLLOW-UP VISIT: CPT | Performed by: OTOLARYNGOLOGY

## 2017-12-21 ASSESSMENT — PAIN SCALES - GENERAL: PAINLEVEL: SEVERE PAIN (6)

## 2017-12-21 NOTE — PROGRESS NOTES
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   4/12/12: resection of recurrent left cheek fibromyxosarcoma. Proximal facial nerve branches were not available so nerve grafting was not performed. S/P orthodromic temporalis tendon transfer with fascia demetria sling. S/p ALT free flap by Dr. Tyler. Has soft tissue contour deformity due to fascial attachment at left melolabial fold.   6/13/14: s/p left detachment of dermis from underlying perioral tendon (path= no residual tumor).  12/13/17: stage 3 reconstruction of his superior melolabial crease with tissue excision and inferiorly based upper lip advancement rotation flap to address the fold of his left upper lip from redundant skin.   2) Left cheek and soft tissue defect from recurrent fibromyxosarcoma excision s/p ALT free flap by Dr. Tyler 4/12/12. Has soft tissue deformity due to tissue mismatch at the anterior cheek margin and jawline.  6/13/14: s/p left ALT flap contouring of jawline and anterior cheek (path= no residual tumor).  3) Detached left earlobe.  6/13/14: s/p left earlobe reattachment.   Comorbidities: None   Pertinent medications: None   Checklist:   _Path from 12/13/17.  _RTC 1 week for suture removal.        Facial Plastic and Reconstructive Surgery Note    Today I had the pleasure of seeing the patient at the Facial Plastic and Reconstructive Surgery Clinic in the Department of Otolaryngology at [facility]. The patient underwent reconstruction last week. Pain is appropriately controlled and decreasing. No bleeding or discharge from the wound. He has noticed his nasal breathing on the left side is better. Also, his lips are not a chapped.     On examination, the patient is in no apparent distress, no stridor, voice is strong.   The flap is viable with adequate color and capillary refill. Sutures were removed. Incisions are clean, dry,  and intact. No evidence of hematoma or infection.     Pathology from the excess skin excised during the reconstruction is still pending.    Impression and Recommendations: status post reconstruction last week. The patient is recovering well and the reconstruction appears viable. Continue with careful wound care to maintain wound moisture and promote healing. The patient can shower and get the reconstruction site wet but should avoid scrubbing. We discussed the importance of sun protection especially at the reconstruction site. Follow up in 6-8 weeks for monitoring. I encouraged the patient to call or return sooner if questions or problems arise. David Jimenez

## 2017-12-21 NOTE — LETTER
12/21/2017       RE: Chirag Narvaez  [address] [place] [address]     Dear Colleague,    Thank you for referring your patient, Chirag Narvaez, to the [facility] at [facility]. Please see a copy of my visit note below.    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   4/12/12: resection of recurrent left cheek fibromyxosarcoma. Proximal facial nerve branches were not available so nerve grafting was not performed. S/P orthodromic temporalis tendon transfer with fascia demetria sling. S/p ALT free flap by Dr. Tyler. Has soft tissue contour deformity due to fascial attachment at left melolabial fold.   6/13/14: s/p left detachment of dermis from underlying perioral tendon (path= no residual tumor).  12/13/17: stage 3 reconstruction of his superior melolabial crease with tissue excision and inferiorly based upper lip advancement rotation flap to address the fold of his left upper lip from redundant skin.   2) Left cheek and soft tissue defect from recurrent fibromyxosarcoma excision s/p ALT free flap by Dr. Tyler 4/12/12. Has soft tissue deformity due to tissue mismatch at the anterior cheek margin and jawline.  6/13/14: s/p left ALT flap contouring of jawline and anterior cheek (path= no residual tumor).  3) Detached left earlobe.  6/13/14: s/p left earlobe reattachment.   Comorbidities: None   Pertinent medications: None   Checklist:   _Path from 12/13/17.  _RTC 1 week for suture removal.        Facial Plastic and Reconstructive Surgery Note    Today I had the pleasure of seeing the patient at the Facial Plastic and Reconstructive Surgery Clinic in the Department of Otolaryngology at [facility]. The patient underwent reconstruction last week. Pain is appropriately controlled and decreasing. No bleeding or discharge from the wound. He has  noticed his nasal breathing on the left side is better. Also, his lips are not a chapped.     On examination, the patient is in no apparent distress, no stridor, voice is strong.   The flap is viable with adequate color and capillary refill. Sutures were removed. Incisions are clean, dry, and intact. No evidence of hematoma or infection.     Pathology from the excess skin excised during the reconstruction is still pending.    Impression and Recommendations: status post reconstruction last week. The patient is recovering well and the reconstruction appears viable. Continue with careful wound care to maintain wound moisture and promote healing. The patient can shower and get the reconstruction site wet but should avoid scrubbing. We discussed the importance of sun protection especially at the reconstruction site. Follow up in 6-8 weeks for monitoring. I encouraged the patient to call or return sooner if questions or problems arise. David Jimenez        Again, thank you for allowing me to participate in the care of your patient.      Sincerely,    David Jimenez MD

## 2017-12-21 NOTE — MR AVS SNAPSHOT
After Visit Summary   12/21/2017    Chirag Narvaez    MRN: [id number]           Patient Information     Date Of Birth          1955        Visit Information        Provider Department      12/21/2017 10:15 AM David Jimenez MD [facility]        Today's Diagnoses     Fibromyxosarcoma (H)    -  1       Follow-ups after your visit        Follow-up notes from your care team     Return in about 6 weeks (around 2/1/2018).      Your next 10 appointments already scheduled     Jan 03, 2018  1:15 PM CST   Return Visit with Ale Vergara MD   [facility] ([facility])    [address] [place] [address]   [phone]            Feb 07, 2018  1:00 PM CST   Return Visit with David Jimenez MD   [facility] ([facility])    [address] [place] [address]   [phone]            Feb 22, 2018  1:00 PM CST   (Arrive by 12:45 PM)   Return Visit with Vasquez Romo MD   [facility] ([facility])    [address] [place] [address] [phone]              Who to contact     If you have questions or need follow up information about today's clinic visit or your schedule please contact [facility] directly at [phone].  Normal or non-critical lab and imaging results will be communicated to you by MyChart, letter or phone within 4 business days after the clinic has received the results. If you do not hear from us within 7 days, please contact the clinic through MyChart or phone. If you have a critical or abnormal lab result, we will notify you by phone as soon as possible.  Submit refill requests through [employer] or call your pharmacy and they will forward the refill request to us. Please allow 3 business days for your refill to be completed.          Additional Information About Your Visit         [facility] Information     [employer] gives you secure access to your electronic health record. If you see a primary care provider, you can also send messages to your care team and make appointments. If you have questions, please call your primary care clinic.  If you do not have a primary care provider, please call [phone] and they will assist you.      [employer] is an electronic gateway that provides easy, online access to your medical records. With [employer], you can request a clinic appointment, read your test results, renew a prescription or communicate with your care team.     To access your existing account, please contact your [facility] or call [phone] for assistance.        Care EveryWhere ID     This is your Care EveryWhere ID. This could be used by other organizations to access your [place] medical records  [id number]         Blood Pressure from Last 3 Encounters:   12/13/17 133/73   06/13/14 124/81   05/17/12 121/73    Weight from Last 3 Encounters:   11/17/17 75.8 kg (167 lb)   11/08/17 75.8 kg (167 lb)   10/19/16 74.8 kg (165 lb)              Today, you had the following     No orders found for display       Primary Care Provider Office Phone # Fax #    James Goodwin [phone]       [facility] [address] [facility] [address]        Equal Access to Services     MISHA ALMODOVAR : Hadii aad ku hadasho Soomaali, waaxda luqadaha, qaybta kaalmada adeegyada, florina mccullough . So [facility] [phone].    ATENCIÓN: Si habla español, tiene a dupont disposición servicios gratuitos de asistencia lingüística. Maddie al [phone].    We comply with applicable federal civil rights laws and [place] laws. We do not discriminate on the basis of race, color, national origin, age, disability, sex, sexual orientation, or gender identity.            Thank you!     Thank you for choosing [facility]  for  "your care. Our goal is always to provide you with excellent care. Hearing back from our patients is one way we can continue to improve our services. Please take a few minutes to complete the written survey that you may receive in the mail after your visit with us. Thank you!             Your Updated Medication List - Protect others around you: Learn how to safely use, store and throw away your medicines at www.disposemymeds.org.          This list is accurate as of: 12/21/17 10:41 AM.  Always use your most recent med list.                   Brand Name Dispense Instructions for use Diagnosis    * AMYLASE CONCENTRATE PO      Take  by mouth.        * DIGESTIVE ENZYMES PO      Take  by mouth.        CURCUMIN           erythromycin ophthalmic ointment    ROMYCIN    3.5 g    Apply small amount to incision sites three times a day and 1/2\" strip into operated eye at bedtime    Postoperative eye state       HERBAL ENERGY COMPLEX PO      Take  by mouth.        HYDROcodone-acetaminophen 5-325 MG per tablet    NORCO    12 tablet    Take 1 tablet by mouth every 6 hours as needed for pain Maximum of 4000 mg of acetaminophen in 24 hours.    Postoperative eye state       Iodine Bell           MAG-CAPS OR      Take  by mouth.        MODIFIED CITRUS PECTIN 100-25-2.5 MG Tabs      Take  by mouth.        UBIQUINOL PO      Take  by mouth.        VALTREX PO      Take 50 mg by mouth        VITAMIN D3           * Notice:  This list has 2 medication(s) that are the same as other medications prescribed for you. Read the directions carefully, and ask your doctor or other care provider to review them with you.      "

## 2017-12-21 NOTE — NURSING NOTE
"Chirag Narvaez's goals for this visit include:   Chief Complaint   Patient presents with     RECHECK       He requests these members of his care team be copied on today's visit information: James Goodwin      PCP: James Goodwin    Referring Provider:  No referring provider defined for this encounter.    Chief Complaint   Patient presents with     RECHECK       Initial There were no vitals taken for this visit. Estimated body mass index is 24.18 kg/(m^2) as calculated from the following:    Height as of 11/17/17: 1.77 m (5' 9.69\").    Weight as of 11/17/17: 75.8 kg (167 lb).  Medication Reconciliation: complete    Do you need any medication refills at today's visit? No    Lee Ann Reeves LPN      "

## 2018-01-03 ENCOUNTER — OFFICE VISIT (OUTPATIENT)
Dept: OPHTHALMOLOGY | Facility: CLINIC | Age: 63
End: 2018-01-03
Payer: MEDICARE

## 2018-01-03 DIAGNOSIS — H02.234 PARALYTIC LAGOPHTHALMOS OF LEFT UPPER EYELID: Primary | ICD-10-CM

## 2018-01-03 PROCEDURE — 99024 POSTOP FOLLOW-UP VISIT: CPT | Performed by: OPHTHALMOLOGY

## 2018-01-03 NOTE — MR AVS SNAPSHOT
After Visit Summary   1/3/2018    Chirag Narvaez    MRN: [id number]           Patient Information     Date Of Birth          1955        Visit Information        Provider Department      1/3/2018 1:15 PM Ale Vergara MD [facility]        Today's Diagnoses     Paralytic lagophthalmos of left upper eyelid    -  1       Follow-ups after your visit        Follow-up notes from your care team     Return if symptoms worsen or fail to improve.      Your next 10 appointments already scheduled     Feb 08, 2018  1:45 PM CST   Return Visit with David Jimenez MD   [facility] ([facility])    [id number] [place] [address] [phone]            Feb 22, 2018  1:00 PM CST   (Arrive by 12:45 PM)   Return Visit with Vasquez Romo MD   [facility] ([facility])    [address] [address] [phone]              Who to contact     If you have questions or need follow up information about today's clinic visit or your schedule please contact [facility] directly at [phone].  Normal or non-critical lab and imaging results will be communicated to you by MyChart, letter or phone within 4 business days after the clinic has received the results. If you do not hear from us within 7 days, please contact the clinic through MyChart or phone. If you have a critical or abnormal lab result, we will notify you by phone as soon as possible.  Submit refill requests through [employer] or call your pharmacy and they will forward the refill request to us. Please allow 3 business days for your refill to be completed.          Additional Information About Your Visit        MyChart Information     [employer] gives you secure access to your electronic health record. If you see a primary care provider, you can also send messages to your care team and make  appointments. If you have questions, please call your primary care clinic.  If you do not have a primary care provider, please call [phone] and they will assist you.      [employer] is an electronic gateway that provides easy, online access to your medical records. With [employer], you can request a clinic appointment, read your test results, renew a prescription or communicate with your care team.     To access your existing account, please contact your [facility] or call [phone] for assistance.        Care EveryWhere ID     This is your Care EveryWhere ID. This could be used by other organizations to access your [place] medical records  [id number]         Blood Pressure from Last 3 Encounters:   12/13/17 133/73   06/13/14 124/81   05/17/12 121/73    Weight from Last 3 Encounters:   11/17/17 75.8 kg (167 lb)   11/08/17 75.8 kg (167 lb)   10/19/16 74.8 kg (165 lb)              Today, you had the following     No orders found for display       Primary Care Provider Office Phone # Fax #    James Maqruezdegary [phone]       [facility] [address]  [facility] [address]        Equal Access to Services     MISHA ALMODOVAR : Hadii aad ku hadasho Soomaali, waaxda luqadaha, qaybta kaalmada adeegyada, waxay idiin hayaan adeeg kharaagata lachang olivera. So [facility] [phone].    ATENCIÓN: Si habla español, tiene a dupont disposición servicios gratuitos de asistencia lingüística. Llame al [phone].    We comply with applicable federal civil rights laws and [place] laws. We do not discriminate on the basis of race, color, national origin, age, disability, sex, sexual orientation, or gender identity.            Thank you!     Thank you for choosing [facility]  for your care. Our goal is always to provide you with excellent care. Hearing back from our patients is one way we can continue to improve our services. Please take a few minutes to  "complete the written survey that you may receive in the mail after your visit with us. Thank you!             Your Updated Medication List - Protect others around you: Learn how to safely use, store and throw away your medicines at www.disposemymeds.org.          This list is accurate as of: 1/3/18  2:39 PM.  Always use your most recent med list.                   Brand Name Dispense Instructions for use Diagnosis    * AMYLASE CONCENTRATE PO      Take  by mouth.        * DIGESTIVE ENZYMES PO      Take  by mouth.        CURCUMIN           erythromycin ophthalmic ointment    ROMYCIN    3.5 g    Apply small amount to incision sites three times a day and 1/2\" strip into operated eye at bedtime    Postoperative eye state       HERBAL ENERGY COMPLEX PO      Take  by mouth.        HYDROcodone-acetaminophen 5-325 MG per tablet    NORCO    12 tablet    Take 1 tablet by mouth every 6 hours as needed for pain Maximum of 4000 mg of acetaminophen in 24 hours.    Postoperative eye state       Iodine Bell           MAG-CAPS OR      Take  by mouth.        MODIFIED CITRUS PECTIN 100-25-2.5 MG Tabs      Take  by mouth.        UBIQUINOL PO      Take  by mouth.        VALTREX PO      Take 50 mg by mouth        VITAMIN D3           * Notice:  This list has 2 medication(s) that are the same as other medications prescribed for you. Read the directions carefully, and ask your doctor or other care provider to review them with you.      "

## 2018-01-03 NOTE — PROGRESS NOTES
Chirag Narvaez is status post left upper eyelid weight revision for exposed weight.   Incision(s) healing well.  The lid(s)  is  in good position. Very good symmetry at rest, about 2-3 mm of lagophthalmos with good bells phenomenon. No evident contour of weight, skin has healed nicely.     Path from Dr. iJmenez's portion is all benign     I have recommended:  * Continue antibiotic ointment or bland lubricating ointment (eg vaseline or aquaphor) to the incision site BID.  * Warm soaks 3-4x daily until all edema and ecchymoses resolve  Continue scleral lens and lubricating drops  * Seeing Dr. Friedman in the spring, f/u with me as needed, may want tarso opened, would likely do LTS at same time.     Attending Physician Attestation:  Complete documentation of historical and exam elements from today's encounter can be found in the full encounter summary report (not reduplicated in this progress note).  I personally obtained the chief complaint(s) and history of present illness.  I confirmed and edited as necessary the review of systems, past medical/surgical history, family history, social history, and examination findings as documented by others; and I examined the patient myself.  I personally reviewed the relevant tests, images, and reports as documented above.  I formulated and edited as necessary the assessment and plan and discussed the findings and management plan with the patient and family. - Ale Vergara MD

## 2018-02-08 ENCOUNTER — OFFICE VISIT (OUTPATIENT)
Dept: OTOLARYNGOLOGY | Facility: CLINIC | Age: 63
End: 2018-02-08
Payer: MEDICARE

## 2018-02-08 DIAGNOSIS — C49.9 FIBROMYXOSARCOMA (H): Primary | ICD-10-CM

## 2018-02-08 PROCEDURE — 99024 POSTOP FOLLOW-UP VISIT: CPT | Performed by: OTOLARYNGOLOGY

## 2018-02-08 ASSESSMENT — PAIN SCALES - GENERAL: PAINLEVEL: NO PAIN (0)

## 2018-02-08 NOTE — NURSING NOTE
"Chirag Narvaez's goals for this visit include:   Chief Complaint   Patient presents with     Wound Check       He requests these members of his care team be copied on today's visit information: James Goodwin      PCP: James Goodwin    Referring Provider:  No referring provider defined for this encounter.    Chief Complaint   Patient presents with     Wound Check       Initial There were no vitals taken for this visit. Estimated body mass index is 24.18 kg/(m^2) as calculated from the following:    Height as of 11/17/17: 1.77 m (5' 9.69\").    Weight as of 11/17/17: 75.8 kg (167 lb).  Medication Reconciliation: complete    Do you need any medication refills at today's visit? No    Alexa Mathew RN    "

## 2018-02-08 NOTE — LETTER
2/8/2018         RE: Chirag Narvaez  [address] [place] [address]        Dear Colleague,    Thank you for referring your patient, Chirag Narvaez, to the [facility]. Please see a copy of my visit note below.    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   4/12/12: resection of recurrent left cheek fibromyxosarcoma. Proximal facial nerve branches were not available so nerve grafting was not performed. S/P orthodromic temporalis tendon transfer with fascia demetria sling. S/p ALT free flap by Dr. Tyler. Has soft tissue contour deformity due to fascial attachment at left melolabial fold.   6/13/14: s/p left detachment of dermis from underlying perioral tendon (path= no residual tumor).  12/13/17: stage 3 reconstruction of his superior melolabial crease with tissue excision and inferiorly based upper lip advancement rotation flap to address the fold of his left upper lip from redundant skin (path = benign skin).    2) Left cheek and soft tissue defect from recurrent fibromyxosarcoma excision s/p ALT free flap by Dr. Tyler 4/12/12. Has soft tissue deformity due to tissue mismatch at the anterior cheek margin and jawline.  6/13/14: s/p left ALT flap contouring of jawline and anterior cheek (path= no residual tumor).  3) Detached left earlobe.  6/13/14: s/p left earlobe reattachment.   Comorbidities: None   Pertinent medications: None   Checklist:   _RTC prn.      Facial Plastic and Reconstructive Surgery Note    Today I had the pleasure of seeing the patient at the Facial Plastic and Reconstructive Surgery Clinic in the Department of Otolaryngology at [facility]. He follows up after undergoing reconstruction. The patient has been doing well since our last visit. No pain, bleeding or discharge from the wound. May have removed an extruding suture about 2 weeks ago.      Physical examination:   The patient is in no apparent distress, no stridor, voice is strong.   The tissue at the reconstruction site is 100% viable with adequate color and capillary refill. All incisions are clean, dry, and intact. No evidence of hematoma or infection. The facial contour is markedly improved in the area of the recent reconstruction.     IMPRESSION AND RECOMMENDATIONS  He underwent stage 3 reconstruction with local flaps over 6 weeks ago. Both the patient and I are very satisfied with the results of the reconstruction. We discussed the importance of sun protection especially at the reconstruction site. I also recommend continued cancer surveillance.   He can follow up with me on an as needed basis. I encouraged him to call or return anytime if questions arise or if I can be of service.     David Jimenez MD    Division of Facial Plastic and Reconstructive Surgery,   Department of Otolaryngology  [facility]         Again, thank you for allowing me to participate in the care of your patient.        Sincerely,        David Jimenez MD

## 2018-02-08 NOTE — PROGRESS NOTES
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   4/12/12: resection of recurrent left cheek fibromyxosarcoma. Proximal facial nerve branches were not available so nerve grafting was not performed. S/P orthodromic temporalis tendon transfer with fascia demetria sling. S/p ALT free flap by Dr. Tyler. Has soft tissue contour deformity due to fascial attachment at left melolabial fold.   6/13/14: s/p left detachment of dermis from underlying perioral tendon (path= no residual tumor).  12/13/17: stage 3 reconstruction of his superior melolabial crease with tissue excision and inferiorly based upper lip advancement rotation flap to address the fold of his left upper lip from redundant skin (path = benign skin).    2) Left cheek and soft tissue defect from recurrent fibromyxosarcoma excision s/p ALT free flap by Dr. Tyler 4/12/12. Has soft tissue deformity due to tissue mismatch at the anterior cheek margin and jawline.  6/13/14: s/p left ALT flap contouring of jawline and anterior cheek (path= no residual tumor).  3) Detached left earlobe.  6/13/14: s/p left earlobe reattachment.   Comorbidities: None   Pertinent medications: None   Checklist:   _RTC prn.      Facial Plastic and Reconstructive Surgery Note    Today I had the pleasure of seeing the patient at the Facial Plastic and Reconstructive Surgery Clinic in the Department of Otolaryngology at [facility]. He follows up after undergoing reconstruction. The patient has been doing well since our last visit. No pain, bleeding or discharge from the wound. May have removed an extruding suture about 2 weeks ago.     Physical examination:   The patient is in no apparent distress, no stridor, voice is strong.   The tissue at the reconstruction site is 100% viable with adequate color and capillary refill. All incisions are clean, dry, and intact. No evidence of  hematoma or infection. The facial contour is markedly improved in the area of the recent reconstruction.     IMPRESSION AND RECOMMENDATIONS  He underwent stage 3 reconstruction with local flaps over 6 weeks ago. Both the patient and I are very satisfied with the results of the reconstruction. We discussed the importance of sun protection especially at the reconstruction site. I also recommend continued cancer surveillance.   He can follow up with me on an as needed basis. I encouraged him to call or return anytime if questions arise or if I can be of service.     David Jimenez MD    Division of Facial Plastic and Reconstructive Surgery,   Department of Otolaryngology  [facility]

## 2018-02-08 NOTE — MR AVS SNAPSHOT
After Visit Summary   2/8/2018    Chirag Narvaez    MRN: [id number]           Patient Information     Date Of Birth          1955        Visit Information        Provider Department      2/8/2018 1:45 PM David Jimenez MD [facility]        Today's Diagnoses     Fibromyxosarcoma (H)    -  1       Follow-ups after your visit        Your next 10 appointments already scheduled     Feb 22, 2018  1:00 PM CST   (Arrive by 12:45 PM)   Return Visit with Vasquez Romo MD   [facility] Ear Nose and Throat ([facility])    [address] [address] [phone]              Who to contact     If you have questions or need follow up information about today's clinic visit or your schedule please contact [facility] directly at [phone].  Normal or non-critical lab and imaging results will be communicated to you by MyChart, letter or phone within 4 business days after the clinic has received the results. If you do not hear from us within 7 days, please contact the clinic through [employer] or phone. If you have a critical or abnormal lab result, we will notify you by phone as soon as possible.  Submit refill requests through [employer] or call your pharmacy and they will forward the refill request to us. Please allow 3 business days for your refill to be completed.          Additional Information About Your Visit        MyChart Information     [employer] gives you secure access to your electronic health record. If you see a primary care provider, you can also send messages to your care team and make appointments. If you have questions, please call your primary care clinic.  If you do not have a primary care provider, please call [phone] and they will assist you.      [employer] is an electronic gateway that provides easy, online access to your medical records. With [employer], you can request a clinic appointment,  read your test results, renew a prescription or communicate with your care team.     To access your existing account, please contact your [facility] or call [phone] for assistance.        Care EveryWhere ID     This is your Care EveryWhere ID. This could be used by other organizations to access your [place] medical records  [id number]         Blood Pressure from Last 3 Encounters:   12/13/17 133/73   06/13/14 124/81   05/17/12 121/73    Weight from Last 3 Encounters:   11/17/17 75.8 kg (167 lb)   11/08/17 75.8 kg (167 lb)   10/19/16 74.8 kg (165 lb)              Today, you had the following     No orders found for display       Primary Care Provider Office Phone # Fax #    James Goodwin [phone]       ZAHRA COLE [facility] [address] [facility] [address]        Equal Access to Services     STEPHY ALMODOVAR : Hadii aad ku hadasho Soomaali, waaxda luqadaha, qaybta kaalmada adeegyada, waxay idiin hayaan amadeo mccullough . So [facility] [phone].    ATENCIÓN: Si habla español, tiene a dupont disposición servicios gratuitos de asistencia lingüística. Llame al [phone].    We comply with applicable federal civil rights laws and [place] laws. We do not discriminate on the basis of race, color, national origin, age, disability, sex, sexual orientation, or gender identity.            Thank you!     Thank you for choosing [facility]  for your care. Our goal is always to provide you with excellent care. Hearing back from our patients is one way we can continue to improve our services. Please take a few minutes to complete the written survey that you may receive in the mail after your visit with us. Thank you!             Your Updated Medication List - Protect others around you: Learn how to safely use, store and throw away your medicines at www.disposemymeds.org.          This list is accurate as of 2/8/18  2:05 PM.  Always use your  "most recent med list.                   Brand Name Dispense Instructions for use Diagnosis    * AMYLASE CONCENTRATE PO      Take  by mouth.        * DIGESTIVE ENZYMES PO      Take  by mouth.        CURCUMIN           erythromycin ophthalmic ointment    ROMYCIN    3.5 g    Apply small amount to incision sites three times a day and 1/2\" strip into operated eye at bedtime    Postoperative eye state       HERBAL ENERGY COMPLEX PO      Take  by mouth.        HYDROcodone-acetaminophen 5-325 MG per tablet    NORCO    12 tablet    Take 1 tablet by mouth every 6 hours as needed for pain Maximum of 4000 mg of acetaminophen in 24 hours.    Postoperative eye state       Iodine Bell           MAG-CAPS OR      Take  by mouth.        MODIFIED CITRUS PECTIN 100-25-2.5 MG Tabs      Take  by mouth.        UBIQUINOL PO      Take  by mouth.        VALTREX PO      Take 50 mg by mouth        VITAMIN D3           * Notice:  This list has 2 medication(s) that are the same as other medications prescribed for you. Read the directions carefully, and ask your doctor or other care provider to review them with you.      "

## 2018-02-22 ENCOUNTER — OFFICE VISIT (OUTPATIENT)
Dept: OTOLARYNGOLOGY | Facility: CLINIC | Age: 63
End: 2018-02-22
Payer: COMMERCIAL

## 2018-02-22 VITALS — BODY MASS INDEX: 25.77 KG/M2 | WEIGHT: 174 LBS | HEIGHT: 69 IN

## 2018-02-22 DIAGNOSIS — H90.3 SENSORINEURAL HEARING LOSS (SNHL) OF BOTH EARS: ICD-10-CM

## 2018-02-22 DIAGNOSIS — H61.23 BILATERAL IMPACTED CERUMEN: Primary | ICD-10-CM

## 2018-02-22 ASSESSMENT — PAIN SCALES - GENERAL: PAINLEVEL: NO PAIN (0)

## 2018-02-22 NOTE — LETTER
2/22/2018       RE: Chirag Narvaez  [address] [place] [address]     Dear Colleague,    Thank you for referring your patient, Chirag Narvaez, to the [facility] EAR NOSE AND TH[facility]AT at [facility]. Please see a copy of my visit note below.    The patient presents with a history of cerumen impaction in the ears.  The patient does not have difficulty with infections of the ears.  The patient has had a free flap on the left side of the face and this has resulted in a slightly narrower left ear canal.       All other systems were reviewed and they are either negative or they are not directly pertinent to this Otolaryngology examination.    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     Hearing problem      History of radiation therapy 9/5-10/25/2002    Dose 6.660 cGy     Liver disease 1996     Radiation 2008     Reduced vision      Tinnitus [id number]       Past Surgical History:    Past Surgical History:   Procedure Laterality Date     BLEPHAROPLASTY, BROW LIFT, COMBINED  2004     Brow lif[  2007     C VAS CLIPS  2003     CL AFF SURGICAL PATHOLOGY      mult tumor removal     GRAFT FREE VASCULARIZED (LOCATION)  4/12/2012    Procedure:GRAFT FREE VASCULARIZED (LOCATION); Left Anterolateral Thigh Free Tissue Transfer; Surgeon:ERIN PAUL; [facility]     IMPLANT GOLD WEIGHT EYELID  2008, 2011     IMPLANT GOLD WEIGHT EYELID Left 12/13/2017    Procedure: IMPLANT WEIGHT EYELID;  left upper eyelid weight revision, revise scar face;  Surgeon: Ale Vergara MD;  Location: [facility] OR     LIVER BIOPSY  1996     PAROTIDECTOMY  1984     PAROTIDECTOMY, RADICAL NECK DISSECTION  4/12/2012    Procedure:PAROTIDECTOMY, RADICAL NECK DISSECTION; Left Radical Parotidectomy with Resection of Over Lying Skin, Facial Nerve Sacrifice, Left Neck Dissection,  "Left Zygoma Resection; Orthodromic Temporalis Tendon Transfer; Left Anterolateral Thigh Free Tissue Transfer  ; Surgeon:MARINA BRANHAM; Location[facility] OR     REANIMATION FACE  4/12/2012    Procedure:REANIMATION FACE; Orthodromic temporalis tendon transfer. ; Surgeon:JEFFERY JIMENEZ; [facility] OR     RECONSTRUCT FOREHEAD  6/13/2014    Procedure: RECONSTRUCT FOREHEAD;  Surgeon: Jeffrey Jimenez MD;  Location: [facility] OR     REPAIR LID LOWER COSMETIC, REPAIR PTOSIS, COMBINED       REPAIR PTOSIS BROW BILATERAL       REVISE SCAR FACE N/A 12/13/2017    Procedure: REVISE SCAR FACE;;  Surgeon: Jeffery Jimenez MD;  Location: [facility] OR       Medications:      Current Outpatient Prescriptions:      ValACYclovir HCl (VALTREX PO), Take 50 mg by mouth, Disp: , Rfl:      erythromycin (ROMYCIN) ophthalmic ointment, Apply small amount to incision sites three times a day and 1/2\" strip into operated eye at bedtime, Disp: 3.5 g, Rfl: 0     HYDROcodone-acetaminophen (NORCO) 5-325 MG per tablet, Take 1 tablet by mouth every 6 hours as needed for pain Maximum of 4000 mg of acetaminophen in 24 hours., Disp: 12 tablet, Rfl: 0     Digestive Enzymes (AMYLASE CONCENTRATE PO), Take  by mouth., Disp: , Rfl:      Pectin Cit-Inos-C-Bioflav-Soy (MODIFIED CITRUS PECTIN) 100-25-2.5 MG TABS, Take  by mouth., Disp: , Rfl:      Iodine GAVIN, , Disp: , Rfl:      DIGESTIVE ENZYMES PO, Take  by mouth., Disp: , Rfl:      UBIQUINOL PO, Take  by mouth., Disp: , Rfl:      Turmeric, Curcuma Longa, (CURCUMIN), , Disp: , Rfl:      Cholecalciferol (VITAMIN D3), , Disp: , Rfl:      Magnesium Oxide (MAG-CAPS OR), Take  by mouth., Disp: , Rfl:      Misc Natural Products (HERBAL ENERGY COMPLEX PO), Take  by mouth., Disp: , Rfl:     Allergies:    Nkda [no known drug allergies]    Physical Examination:    The patient is a well developed, well nourished male in no apparent distress.      Oral Cavity Examination:  Normal mucosa with no masses or lesions  Nasal Examination: Normal " mucosa with no masses or lesions  Ear Examination: Ear canals impacted with cerumen bilaterally.  The ear canals are cleaned today using an alligator forceps using a binocular microscope for visualization. The tympanic membranes and middle ear spaces normal bilaterally.    Assessment and Plan:    The patient presents with a history of cerumen impaction.  The patient's ears are cleaned today. He will be seen again as needed. An Audiogram and Tympanogram will be ordered to assess his hearing.       Again, thank you for allowing me to participate in the care of your patient.      Sincerely,    Vasquez Romo MD

## 2018-02-22 NOTE — PATIENT INSTRUCTIONS
The patient presents with a history of cerumen impaction.  The patient's ears are cleaned today. He will be seen again as needed. An Audiogram and Tympanogram will be ordered to assess his hearing.

## 2018-02-22 NOTE — NURSING NOTE
Chief Complaint   Patient presents with     RECHECK     ear cleaning     Roro Leiva Medical Assistant

## 2018-02-22 NOTE — PROGRESS NOTES
The patient presents with a history of cerumen impaction in the ears.  The patient does not have difficulty with infections of the ears.  The patient has had a free flap on the left side of the face and this has resulted in a slightly narrower left ear canal.       All other systems were reviewed and they are either negative or they are not directly pertinent to this Otolaryngology examination.    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     Hearing problem      History of radiation therapy 9/5-10/25/2002    Dose 6.660 cGy     Liver disease 1996     Radiation 2008     Reduced vision      Tinnitus [id number]       Past Surgical History:    Past Surgical History:   Procedure Laterality Date     BLEPHAROPLASTY, BROW LIFT, COMBINED  2004     Brow lif[  2007     C VAS CLIPS  2003     CL AFF SURGICAL PATHOLOGY      mult tumor removal     GRAFT FREE VASCULARIZED (LOCATION)  4/12/2012    Procedure:GRAFT FREE VASCULARIZED (LOCATION); Left Anterolateral Thigh Free Tissue Transfer; Surgeon:ERIN PAUL; [facility]     IMPLANT GOLD WEIGHT EYELID  2008, 2011     IMPLANT GOLD WEIGHT EYELID Left 12/13/2017    Procedure: IMPLANT WEIGHT EYELID;  left upper eyelid weight revision, revise scar face;  Surgeon: Ale Vergara MD;  Location: [facility] OR     LIVER BIOPSY  1996     PAROTIDECTOMY  1984     PAROTIDECTOMY, RADICAL NECK DISSECTION  4/12/2012    Procedure:PAROTIDECTOMY, RADICAL NECK DISSECTION; Left Radical Parotidectomy with Resection of Over Lying Skin, Facial Nerve Sacrifice, Left Neck Dissection, Left Zygoma Resection; Orthodromic Temporalis Tendon Transfer; Left Anterolateral Thigh Free Tissue Transfer  ; Surgeon:MARINA BRANHAM; Location[facility] OR     REANIMATION FACE  4/12/2012    Procedure:REANIMATION FACE; Orthodromic temporalis tendon transfer. ; Surgeon:JEFFERY KENDALL; [facility]  "OR     RECONSTRUCT FOREHEAD  6/13/2014    Procedure: RECONSTRUCT FOREHEAD;  Surgeon: David Jimenez MD;  [facility]: [facility] OR     REPAIR LID LOWER COSMETIC, REPAIR PTOSIS, COMBINED       REPAIR PTOSIS BROW BILATERAL       REVISE SCAR FACE N/A 12/13/2017    Procedure: REVISE SCAR FACE;;  Surgeon: David Jimenez MD;  Location: [facility]       Medications:      Current Outpatient Prescriptions:      ValACYclovir HCl (VALTREX PO), Take 50 mg by mouth, Disp: , Rfl:      erythromycin (ROMYCIN) ophthalmic ointment, Apply small amount to incision sites three times a day and 1/2\" strip into operated eye at bedtime, Disp: 3.5 g, Rfl: 0     HYDROcodone-acetaminophen (NORCO) 5-325 MG per tablet, Take 1 tablet by mouth every 6 hours as needed for pain Maximum of 4000 mg of acetaminophen in 24 hours., Disp: 12 tablet, Rfl: 0     Digestive Enzymes (AMYLASE CONCENTRATE PO), Take  by mouth., Disp: , Rfl:      Pectin Cit-Inos-C-Bioflav-Soy (MODIFIED CITRUS PECTIN) 100-25-2.5 MG TABS, Take  by mouth., Disp: , Rfl:      Iodine GAVIN, , Disp: , Rfl:      DIGESTIVE ENZYMES PO, Take  by mouth., Disp: , Rfl:      UBIQUINOL PO, Take  by mouth., Disp: , Rfl:      Turmeric, Curcuma Longa, (CURCUMIN), , Disp: , Rfl:      Cholecalciferol (VITAMIN D3), , Disp: , Rfl:      Magnesium Oxide (MAG-CAPS OR), Take  by mouth., Disp: , Rfl:      Misc Natural Products (HERBAL ENERGY COMPLEX PO), Take  by mouth., Disp: , Rfl:     Allergies:    Nkda [no known drug allergies]    Physical Examination:    The patient is a well developed, well nourished male in no apparent distress.      Oral Cavity Examination:  Normal mucosa with no masses or lesions  Nasal Examination: Normal mucosa with no masses or lesions  Ear Examination: Ear canals impacted with cerumen bilaterally.  The ear canals are cleaned today using an alligator forceps using a binocular microscope for visualization. The tympanic membranes and middle ear spaces normal bilaterally.    Assessment and " Plan:    The patient presents with a history of cerumen impaction.  The patient's ears are cleaned today. He will be seen again as needed. An Audiogram and Tympanogram will be ordered to assess his hearing.

## 2018-02-22 NOTE — MR AVS SNAPSHOT
"              After Visit Summary   2/22/2018    Chirag Narvaez    MRN: [id number]           Patient Information     Date Of Birth          1955        Visit Information        Provider Department      2/22/2018 1:00 PM Vasquez Romo MD [facility] Ear Nose and Throat        Today's Diagnoses     Bilateral impacted cerumen    -  1    Sensorineural hearing loss (SNHL) of both ears          Care Instructions    The patient presents with a history of cerumen impaction.  The patient's ears are cleaned today. He will be seen again as needed. An Audiogram and Tympanogram will be ordered to assess his hearing.             Follow-ups after your visit        Who to contact     Please call your clinic at [phone] to:    Ask questions about your health    Make or cancel appointments    Discuss your medicines    Learn about your test results    Speak to your doctor            Additional Information About Your Visit        MyChart Information     [employer] gives you secure access to your electronic health record. If you see a primary care provider, you can also send messages to your care team and make appointments. If you have questions, please call your primary care clinic.  If you do not have a primary care provider, please call [phone] and they will assist you.      [employer] is an electronic gateway that provides easy, online access to your medical records. With [employer], you can request a clinic appointment, read your test results, renew a prescription or communicate with your care team.     To access your existing account, please contact your [facility] or call [phone] for assistance.        Care EveryWhere ID     This is your Care EveryWhere ID. This could be used by other organizations to access your [place] medical records  [id number]        Your Vitals Were     Height BMI (Body Mass Index)                1.74 m (5' 8.5\") 26.07 kg/m2           Blood Pressure from " "Last 3 Encounters:   12/13/17 133/73   06/13/14 124/81   05/17/12 121/73    Weight from Last 3 Encounters:   02/22/18 78.9 kg (174 lb)   11/17/17 75.8 kg (167 lb)   11/08/17 75.8 kg (167 lb)              We Performed the Following     AUDIO REVIEW/CONSULT     REMOVE IMPACTED CERUMEN        Primary Care Provider Office Phone # Fax #    James Goodwin [phone]       ZAHRA COLE [facility] [address] [facility] [address]        Equal Access to Services     [facility] HospitalELIZABETH : Hadii aad ku hadasho Soomaali, waaxda luqadaha, qaybta kaalmada adeegyada, waxay idiin hayaan aderachana khbrenda mccullough . So [facility] [phone].    ATENCIÓN: Si habla español, tiene a dupont disposición servicios gratuitos de asistencia lingüística. [facility] [phone].    We comply with applicable federal civil rights laws and [place] laws. We do not discriminate on the basis of race, color, national origin, age, disability, sex, sexual orientation, or gender identity.            Thank you!     Thank you for choosing [facility]SE [facility] TH[facility]  for your care. Our goal is always to provide you with excellent care. Hearing back from our patients is one way we can continue to improve our services. Please take a few minutes to complete the written survey that you may receive in the mail after your visit with us. Thank you!             Your Updated Medication List - Protect others around you: Learn how to safely use, store and throw away your medicines at www.disposemymeds.org.          This list is accurate as of 2/22/18  1:15 PM.  Always use your most recent med list.                   Brand Name Dispense Instructions for use Diagnosis    * AMYLASE CONCENTRATE PO      Take  by mouth.        * DIGESTIVE ENZYMES PO      Take  by mouth.        CURCUMIN           erythromycin ophthalmic ointment    ROMYCIN    3.5 g    Apply small amount to incision sites three times a day and 1/2\" strip into operated eye at bedtime    " Postoperative eye state       HERBAL ENERGY COMPLEX PO      Take  by mouth.        HYDROcodone-acetaminophen 5-325 MG per tablet    NORCO    12 tablet    Take 1 tablet by mouth every 6 hours as needed for pain Maximum of 4000 mg of acetaminophen in 24 hours.    Postoperative eye state       Iodine Bell           MAG-CAPS OR      Take  by mouth.        MODIFIED CITRUS PECTIN 100-25-2.5 MG Tabs      Take  by mouth.        UBIQUINOL PO      Take  by mouth.        VALTREX PO      Take 50 mg by mouth        VITAMIN D3           * Notice:  This list has 2 medication(s) that are the same as other medications prescribed for you. Read the directions carefully, and ask your doctor or other care provider to review them with you.

## 2018-02-26 DIAGNOSIS — H90.3 BILATERAL SENSORINEURAL HEARING LOSS: Primary | ICD-10-CM

## 2018-03-22 ENCOUNTER — OFFICE VISIT (OUTPATIENT)
Dept: AUDIOLOGY | Facility: CLINIC | Age: 63
End: 2018-03-22
Payer: COMMERCIAL

## 2018-03-22 DIAGNOSIS — H90.3 SENSORY HEARING LOSS, BILATERAL: Primary | ICD-10-CM

## 2018-03-22 NOTE — PROGRESS NOTES
AUDIOLOGY REPORT    SUBJECTIVE:  Chirag Narvaez is a 62 year old male who was seen in Audiology at the [facility], [facility] and [facility] for audiologic evaluation, referred by Vasquez Romo.  The patient has been seen previously at another facility in 2010 for hearing evaluation and results revealed bilateral normal sloping to unmeasurable sensorineural hearing loss. The patient is unsure if his hearing has decreased since last testing. He reports a constant bilateral ringing tinnitus that is more notable in quiet environments. The patient reports a history of noise exposure as he uses power tools at home. The patient denies any other ear related issues.    OBJECTIVE:  Otoscopic exam indicates ears are clear of cerumen bilaterally     Pure Tone Thresholds assessed using conventional audiometry with good  reliability from 250-8000 Hz bilaterally using circumaural headphones     RIGHT:  Normal through 1500 Hz sloping to profound sensorineural hearing loss; decrease 2084-7707 Hz re: 2010 audio    LEFT:    Normal through 1500 Hz sloping to profound sensorineural hearing loss; decrease 0897-5069 Hz re: 2010 audio    Tympanogram:    RIGHT: normal eardrum mobility    LEFT:   normal eardrum mobility    Reflexes (reported by stimulus ear):  RIGHT: Ipsilateral is present at normal levels  RIGHT: Contralateral is present at normal levels  LEFT:   Ipsilateral is present at normal levels  LEFT:   Contralateral is present at normal levels      Speech Reception Threshold:    RIGHT: 20 dB HL    LEFT:   15 dB HL  Word Recognition Score:     RIGHT: 96% at 60 dB HL using NU-6 recorded word list.    LEFT:   92% at 60 dB HL using NU-6 recorded word list.      PLAN:  Patient was counseled regarding hearing loss and impact on communication.  Patient is a good candidate for amplification at this time and the patient has taken the scheduling number to schedule hearing aid consultation. The patient was  also provided with an application for [employer] (non-profit organization offering discounted hearing aid pricing).  Please call this clinic with questions regarding these results or recommendations.      Tanmay Sanchez.  Licensed Audiologist  [place] #[id number]

## 2018-03-22 NOTE — MR AVS SNAPSHOT
After Visit Summary   3/22/2018    Chirag Narvaez    MRN: [id number]           Patient Information     Date Of Birth          1955        Visit Information        Provider Department      3/22/2018 2:00 PM Radha Killian, Melanie AKIEN [facility] Audiology        Today's Diagnoses     Sensory hearing loss, bilateral    -  1       Follow-ups after your visit        Who to contact     Please call your clinic at [phone] to:    Ask questions about your health    Make or cancel appointments    Discuss your medicines    Learn about your test results    Speak to your doctor            Additional Information About Your Visit        MyChart Information     [employer] gives you secure access to your electronic health record. If you see a primary care provider, you can also send messages to your care team and make appointments. If you have questions, please call your primary care clinic.  If you do not have a primary care provider, please call [phone] and they will assist you.      [employer] is an electronic gateway that provides easy, online access to your medical records. With [employer], you can request a clinic appointment, read your test results, renew a prescription or communicate with your care team.     To access your existing account, please contact your [facility] or call [phone] for assistance.        Care EveryWhere ID     This is your Care EveryWhere ID. This could be used by other organizations to access your [place] medical records  [id number]         Blood Pressure from Last 3 Encounters:   12/13/17 133/73   06/13/14 124/81   05/17/12 121/73    Weight from Last 3 Encounters:   02/22/18 78.9 kg (174 lb)   11/17/17 75.8 kg (167 lb)   11/08/17 75.8 kg (167 lb)              We Performed the Following     AUDIOGRAM/TYMPANOGRAM - INTERFACE     [facility] Audiometry Thrshld Eval & Speech Recog ([address])     Tymps / Reflex   (510[address])        Primary Care Provider Office Phone #  "Fax #    James Goodwin [phone]       [facility] [address]  [facility] [address]        Equal Access to Services     MISHA ALMODOVAR : Hadii leila ku hadsamiro Soomaali, waaxda luqadaha, qaybta kaalmada adeegyada, waxanita moonn madisonrachana rodriguez laSupriyatona olivera. So [facility] [phone].    ATENCIÓN: Si habla español, tiene a dupont disposición servicios gratuitos de asistencia lingüística. Llame al [phone].    We comply with applicable federal civil rights laws and [place] laws. We do not discriminate on the basis of race, color, national origin, age, disability, sex, sexual orientation, or gender identity.            Thank you!     Thank you for choosing [facility] AUDIOLOGY  for your care. Our goal is always to provide you with excellent care. Hearing back from our patients is one way we can continue to improve our services. Please take a few minutes to complete the written survey that you may receive in the mail after your visit with us. Thank you!             Your Updated Medication List - Protect others around you: Learn how to safely use, store and throw away your medicines at www.disposemymeds.org.          This list is accurate as of 3/22/18  2:59 PM.  Always use your most recent med list.                   Brand Name Dispense Instructions for use Diagnosis    * AMYLASE CONCENTRATE PO      Take  by mouth.        * DIGESTIVE ENZYMES PO      Take  by mouth.        CURCUMIN           erythromycin ophthalmic ointment    ROMYCIN    3.5 g    Apply small amount to incision sites three times a day and 1/2\" strip into operated eye at bedtime    Postoperative eye state       HERBAL ENERGY COMPLEX PO      Take  by mouth.        HYDROcodone-acetaminophen 5-325 MG per tablet    NORCO    12 tablet    Take 1 tablet by mouth every 6 hours as needed for pain Maximum of 4000 mg of acetaminophen in 24 hours.    Postoperative eye state       Iodine Bell           MAG-CAPS OR      Take  by " mouth.        MODIFIED CITRUS PECTIN 100-25-2.5 MG Tabs      Take  by mouth.        UBIQUINOL PO      Take  by mouth.        VALTREX PO      Take 50 mg by mouth        VITAMIN D3           * Notice:  This list has 2 medication(s) that are the same as other medications prescribed for you. Read the directions carefully, and ask your doctor or other care provider to review them with you.

## 2018-07-19 ENCOUNTER — OFFICE VISIT (OUTPATIENT)
Dept: OPHTHALMOLOGY | Facility: CLINIC | Age: 63
End: 2018-07-19
Attending: OPHTHALMOLOGY
Payer: MEDICARE

## 2018-07-19 DIAGNOSIS — H18.719: ICD-10-CM

## 2018-07-19 DIAGNOSIS — H18.602 KERATOCONUS OF LEFT EYE: ICD-10-CM

## 2018-07-19 DIAGNOSIS — C49.9 FIBROMYXOSARCOMA (H): ICD-10-CM

## 2018-07-19 DIAGNOSIS — H02.234 PARALYTIC LAGOPHTHALMOS OF LEFT UPPER EYELID: Primary | ICD-10-CM

## 2018-07-19 DIAGNOSIS — H18.602 KERATOCONUS OF LEFT EYE: Primary | ICD-10-CM

## 2018-07-19 PROCEDURE — 92025 CPTRIZED CORNEAL TOPOGRAPHY: CPT | Mod: ZF | Performed by: OPHTHALMOLOGY

## 2018-07-19 PROCEDURE — G0463 HOSPITAL OUTPT CLINIC VISIT: HCPCS | Mod: ZF

## 2018-07-19 ASSESSMENT — VISUAL ACUITY
METHOD: SNELLEN - LINEAR
OS_CC: 20/20
OD_SC: 20/20
CORRECTION_TYPE: CONTACTS

## 2018-07-19 ASSESSMENT — TONOMETRY
OS_IOP_MMHG: SCLE
OD_IOP_MMHG: 09
IOP_METHOD: TONOPEN

## 2018-07-19 ASSESSMENT — SLIT LAMP EXAM - LIDS: COMMENTS: NORMAL

## 2018-07-19 ASSESSMENT — CONF VISUAL FIELD
OS_NORMAL: 1
OD_NORMAL: 1

## 2018-07-19 ASSESSMENT — EXTERNAL EXAM - RIGHT EYE: OD_EXAM: NORMAL

## 2018-07-19 NOTE — PROGRESS NOTES
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 Patient states doing well with use of scleral lens OS with continued improvement of pain and irritation. Saw Dr Friedman today, fitted for new Sceral lens with Hydra-PEG coating. Vision stable    Interval:  Reports everything better since 2014, vision stable, reports no pain, scleral CL gives great relief. Denies redness, flashes, floaters. 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 Mechanical lagophthalmos, left  Continued 2 millimeters lag with good [place]   Seen by Dr. Vergara 1/3/2018 who recommended lateral tarsal strip if tarso opened   Consider tarso revision / tarsal strip per Dr. AIKEN's notes, will copy chart to him  Would need follow up with Dr. DOLAN to ensure proper fitting of scleral lens if tarso removed     3. Corneal ectasia, unspecified laterality  s/p laser in situ keratomileusis (LASIK), with add'l thinning from #1  Appears stable  Vision unchanged  Continue f/up with Dr. DOLAN    Return to clinic: 1 year or sooner as needed     Rich Mast MD  Ophthalmology Resident, PGY-3  [facility]        ~~~~~~~~~~~~~~~~~~~~~~~~~~~~~~~~~~~~~~~~~~~~~~~~~~~~~~~~~~~~~~~~    Complete documentation of historical and exam elements from today's encounter can be found in the full encounter summary report  (not reduplicated in this progress note). I personally obtained the chief complaint(s) and history of present illness.  I confirmed and edited as necessary the review of systems, past medical/surgical history, family history, social history, and examination findings as documented by others.  I examined the patient myself, and I personally reviewed the relevant tests, images, and reports as documented above. I formulated and edited as necessary the assessment and plan and discussed the findings and management plan with the patient and family.     I personally interpreted the diagnostic / imaging study and have edited the interpretation as needed.    Turner Cevallos MD, MA  Director, Cornea & Anterior Segment  [facility] Department of Ophthalmology & Visual Neuroscience

## 2018-07-19 NOTE — MR AVS SNAPSHOT
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      Who to contact     Please call your clinic at [phone] to:    Ask questions about your health    Make or cancel appointments    Discuss your medicines    Learn about your test results    Speak to your doctor            Additional Information About Your Visit        MyChart Information     [employer] gives you secure access to your electronic health record. If you see a primary care provider, you can also send messages to your care team and make appointments. If you have questions, please call your primary care clinic.  If you do not have a primary care provider, please call [phone] and they will assist you.      [employer] is an electronic gateway that provides easy, online access to your medical records. With [employer], you can request a clinic appointment, read your test results, renew a prescription or communicate with your care team.     To access your existing account, please contact your [facility] or call [phone] for assistance.        Care EveryWhere ID     This is your Care EveryWhere ID. This could be used by other organizations to access your [place] medical records  [id number]         Blood Pressure from Last 3 Encounters:   12/13/17 133/73   06/13/14 124/81   05/17/12 121/73    Weight from Last 3 Encounters:   02/22/18 78.9 kg  "(174 lb)   11/17/17 75.8 kg (167 lb)   11/08/17 75.8 kg (167 lb)              We Performed the Following     Corneal Topography OU (both eyes)        Primary Care Provider Office Phone # Fax #    James Goodwin [phone]       [facility] [address]  [facility] [address]        Equal Access to Services     STEPHY ALMODOVAR : Hadii aad ku hadasho Soomaali, waaxda luqadaha, qaybta kaalmada adeegyada, waxay idiin hayaan adeeg kharash la'aan . So [facility] [phone].    ATENCIÓN: Si srinath carter, tiene a dupont disposición servicios gratuitos de asistencia lingüística. Maddie al [phone].    We comply with applicable federal civil rights laws and [place] laws. We do not discriminate on the basis of race, color, national origin, age, disability, sex, sexual orientation, or gender identity.            Thank you!     Thank you for choosing EYE CLINIC  for your care. Our goal is always to provide you with excellent care. Hearing back from our patients is one way we can continue to improve our services. Please take a few minutes to complete the written survey that you may receive in the mail after your visit with us. Thank you!             Your Updated Medication List - Protect others around you: Learn how to safely use, store and throw away your medicines at www.disposemymeds.org.          This list is accurate as of 7/19/18 11:59 PM.  Always use your most recent med list.                   Brand Name Dispense Instructions for use Diagnosis    * AMYLASE CONCENTRATE PO      Take  by mouth.        * DIGESTIVE ENZYMES PO      Take  by mouth.        CURCUMIN           erythromycin ophthalmic ointment    ROMYCIN    3.5 g    Apply small amount to incision sites three times a day and 1/2\" strip into operated eye at bedtime    Postoperative eye state       HERBAL ENERGY COMPLEX PO      Take  by mouth.        HYDROcodone-acetaminophen 5-325 MG per tablet    NORCO    12 tablet    Take 1 " tablet by mouth every 6 hours as needed for pain Maximum of 4000 mg of acetaminophen in 24 hours.    Postoperative eye state       Iodine Bell           MAG-CAPS OR      Take  by mouth.        MODIFIED CITRUS PECTIN 100-25-2.5 MG Tabs      Take  by mouth.        UBIQUINOL PO      Take  by mouth.        VALTREX PO      Take 50 mg by mouth        VITAMIN D3           * Notice:  This list has 2 medication(s) that are the same as other medications prescribed for you. Read the directions carefully, and ask your doctor or other care provider to review them with you.

## 2018-08-08 ENCOUNTER — OFFICE VISIT (OUTPATIENT)
Dept: OPHTHALMOLOGY | Facility: CLINIC | Age: 63
End: 2018-08-08
Payer: MEDICARE

## 2018-08-08 DIAGNOSIS — H02.125 MECHANICAL ECTROPION OF LEFT LOWER EYELID: ICD-10-CM

## 2018-08-08 DIAGNOSIS — H02.234 PARALYTIC LAGOPHTHALMOS OF LEFT UPPER EYELID: Primary | ICD-10-CM

## 2018-08-08 DIAGNOSIS — H02.226: ICD-10-CM

## 2018-08-08 PROCEDURE — 99213 OFFICE O/P EST LOW 20 MIN: CPT | Performed by: OPHTHALMOLOGY

## 2018-08-08 PROCEDURE — 92285 EXTERNAL OCULAR PHOTOGRAPHY: CPT | Performed by: OPHTHALMOLOGY

## 2018-08-08 ASSESSMENT — VISUAL ACUITY
OS_CC+: +2
OS_CC: 20/30
OD_SC+: -1
METHOD: SNELLEN - LINEAR
OD_SC: 20/25

## 2018-08-08 ASSESSMENT — SLIT LAMP EXAM - LIDS: COMMENTS: NORMAL

## 2018-08-08 ASSESSMENT — EXTERNAL EXAM - RIGHT EYE: OD_EXAM: NORMAL

## 2018-08-08 NOTE — Clinical Note
Thanks for sending back, I'll tighten his lower lid and split his tarso.  He loves his scleral lens.

## 2018-08-08 NOTE — MR AVS SNAPSHOT
After Visit Summary   8/8/2018    Chirag Narvaez    MRN: [id number]           Patient Information     Date Of Birth          1955        Visit Information        Provider Department      8/8/2018 1:45 PM Ale Vergara MD [facility]        Today's Diagnoses     Paralytic lagophthalmos of left upper eyelid    -  1    Mechanical lagophthalmos, left - Left Eye        Mechanical ectropion of left lower eyelid           Follow-ups after your visit        Who to contact     If you have questions or need follow up information about today's clinic visit or your schedule please contact [facility] directly at [phone].  Normal or non-critical lab and imaging results will be communicated to you by [employer], letter or phone within 4 business days after the clinic has received the results. If you do not hear from us within 7 days, please contact the clinic through [employer] or phone. If you have a critical or abnormal lab result, we will notify you by phone as soon as possible.  Submit refill requests through [employer] or call your pharmacy and they will forward the refill request to us. Please allow 3 business days for your refill to be completed.          Additional Information About Your Visit        MyChart Information     [employer] gives you secure access to your electronic health record. If you see a primary care provider, you can also send messages to your care team and make appointments. If you have questions, please call your primary care clinic.  If you do not have a primary care provider, please call [phone] and they will assist you.      [employer] is an electronic gateway that provides easy, online access to your medical records. With [employer], you can request a clinic appointment, read your test results, renew a prescription or communicate with your care team.     To access your existing account, please contact your [facility]  or call [phone] for assistance.        Care EveryWhere ID     This is your Care EveryWhere ID. This could be used by other organizations to access your [place] medical records  [id number]         Blood Pressure from Last 3 Encounters:   12/13/17 133/73   06/13/14 124/81   05/17/12 121/73    Weight from Last 3 Encounters:   02/22/18 78.9 kg (174 lb)   11/17/17 75.8 kg (167 lb)   11/08/17 75.8 kg (167 lb)              We Performed the Following     External Photos OU (both eyes)        Primary Care Provider Office Phone # Fax #    James Goodwin [phone]       [facility] [address] [facility] [address]        Equal Access to Services     STEPHY ALMODOVAR : Hadii aad ku hadasho Soomaali, waaxda luqadaha, qaybta kaalmada adeegyada, waxay idiin hayaan adeeg kharaagata laxinn . So [facility] [phone].    ATENCIÓN: Si habla español, tiene a dupont disposición servicios gratuitos de asistencia lingüística. Maddie al [phone].    We comply with applicable federal civil rights laws and [place] laws. We do not discriminate on the basis of race, color, national origin, age, disability, sex, sexual orientation, or gender identity.            Thank you!     Thank you for choosing [facility]  for your care. Our goal is always to provide you with excellent care. Hearing back from our patients is one way we can continue to improve our services. Please take a few minutes to complete the written survey that you may receive in the mail after your visit with us. Thank you!             Your Updated Medication List - Protect others around you: Learn how to safely use, store and throw away your medicines at www.disposemymeds.org.          This list is accurate as of 8/8/18  3:00 PM.  Always use your most recent med list.                   Brand Name Dispense Instructions for use Diagnosis    * AMYLASE CONCENTRATE PO      Take  by mouth.        * DIGESTIVE ENZYMES PO       Take  by mouth.        CURCUMIN           HERBAL ENERGY COMPLEX PO      Take  by mouth.        Iodine Bell           MAG-CAPS OR      Take  by mouth.        MODIFIED CITRUS PECTIN 100-25-2.5 MG Tabs      Take  by mouth.        UBIQUINOL PO      Take  by mouth.        VALTREX PO      Take 50 mg by mouth        VITAMIN D3           * Notice:  This list has 2 medication(s) that are the same as other medications prescribed for you. Read the directions carefully, and ask your doctor or other care provider to review them with you.

## 2018-08-08 NOTE — NURSING NOTE
Patient presents with:  Consult For: possible Tarso removed.       Referring Provider:  No referring provider defined for this encounter.    HPI    Symptoms:           Do you have eye pain now?:  Yes   Location:  OS   Pain Level:  Severe Pain (6)   Other:  since 2009   Pain Frequency:  Constant, Daily   Pain Characteristics:  Aching      Comments:  Had a procedure done 2010 that included Tarso-  Had weight revision done in January 2018.  Now would like tarso removed.  Wants to be able to put his scleral lens in easier, improve Periferal VA, aesthetics, and doesn't feel it help with the pain.              Yesika Sims, COT

## 2018-08-29 ENCOUNTER — OFFICE VISIT (OUTPATIENT)
Dept: OPHTHALMOLOGY | Facility: CLINIC | Age: 63
End: 2018-08-29
Payer: MEDICARE

## 2018-08-29 DIAGNOSIS — Z98.890 HISTORY OF TARSORRHAPHY: ICD-10-CM

## 2018-08-29 DIAGNOSIS — H02.236: ICD-10-CM

## 2018-08-29 DIAGNOSIS — H02.125 MECHANICAL ECTROPION OF LEFT LOWER EYELID: Primary | ICD-10-CM

## 2018-08-29 PROCEDURE — 67710 SEVERING TARSORRHAPHY: CPT | Mod: 51 | Performed by: OPHTHALMOLOGY

## 2018-08-29 PROCEDURE — 67917 REPAIR EYELID DEFECT: CPT | Mod: LT | Performed by: OPHTHALMOLOGY

## 2018-08-29 PROCEDURE — 99024 POSTOP FOLLOW-UP VISIT: CPT | Performed by: OPHTHALMOLOGY

## 2018-08-29 ASSESSMENT — VISUAL ACUITY
OD_SC: 20/30
OS_CC+: +2
CORRECTION_TYPE: CONTACTS
OS_CC: 20/25
METHOD: SNELLEN - LINEAR

## 2018-08-29 NOTE — NURSING NOTE
Patient presents with:  Consult For: Here today for in clinic left lower eyelid ectropion repair and sever tarsorrhaphy      Referring Provider:  No referring provider defined for this encounter.    HPI    Informant(s):  pt   Affected eye(s):  Left   Symptoms:              Comments:  Here today for in clinic left lower eyelid ectropion repair and sever tarsorrhaphy             Roro Sheppard, COA

## 2018-08-29 NOTE — PATIENT INSTRUCTIONS
Post-operative Instructions  Ophthalmic Plastic and Reconstructive Surgery    Ale Vergara M.D.     All instructions apply to the operated eye(s) or eyelid(s).    Wound care and personal care    ? Apply ice compresses 15 minutes on 15 minutes off while awake for 2 days, then switch to warm water compresses 4 times a day until seen by your physician. For warm packs you can place a cup of dry uncooked rice in a clean cotton sock. Then place sock in microwave 30 seconds to one minute. Next place the warm sock into a plastic bag and wrap the bag with clean warm wet washcloth and place over operated eye.    ? You may shower or wash your hair the day after surgery. Do not bathe or go swimming for 1 week to prevent contamination of your wounds.  ? Do not apply make-up to the eyes or eyelids for 2 weeks after surgery.  ? Expect some swelling, bruising, black eye (even into the lower eyelids and cheeks). Also expect serum caking, crusting and discharge from the eye and/or incisions. A small amount of surface bleeding is normal for the first 48 hours.  ? Your eye(s) and eyelid(s) may be painful and tender. This is normal after surgery.      Contact information and follow-up  ? Return to the Eye Clinic for a follow-up appointment with your physician as  scheduled. If no appointment has been scheduled:     -  [facility] eye clinic: [phone] for an appointment with Dr. Vergara within 3 weeks from your date of surgery.     ? For severe pain, bleeding, or loss of vision, call the [facility] at [phone] or [facility] at [phone].     After hours or on weekends and holidays, call [phone] and ask to speak with the ophthalmologist on call.    An on call person can be reached after hours for concerns. The on call doctor should not call in medication refill requests after hours or on weekends, so please plan accordingly. An effort has been made to provide  adequate pain medications following every surgery, and refills will not be provided in most instances. Narcotic pain medications cannot be called in.     Activity restrictions and driving  ? Avoid heavy lifting, bending, exercise or strenuous activity for 1 week after surgery.  You may resume other activities and return to work as tolerated.  ? You may not resume driving until have you stopped using narcotic pain medications (such as Norco, Percocet, Tylenol #3).    Medications  ? Restart all your regular home medications and eye drops. If you take Plavix or  Aspirin on a regular basis, wait for 72 hours after your surgery before restarting these in order to decrease the risk of bleeding complications.  ? Avoid aspirin and aspirin-like medications (Motrin, Aleve, Ibuprofen, Nilda-  [place] etc) for 72 hours to reduce the risk of bleeding. You may take Tylenol  (acetaminophen) for pain.  ? In addition to your home medications, take the following post-operative medications as prescribed by your physician.    ? Apply erythromycin ophthalmic ointment to all sutures three times a day

## 2018-08-29 NOTE — MR AVS SNAPSHOT
After Visit Summary   8/29/2018    Chirag Narvaez    MRN: [id number]           Patient Information     Date Of Birth          1955        Visit Information        Provider Department      8/29/2018 1:30 PM Ale Vergara MD; PROC  1 [facility]        Today's Diagnoses     Mechanical ectropion of left lower eyelid    -  1    Paralytic lagophthalmos of left eye, unspecified eyelid        History of tarsorrhaphy          Care Instructions    Post-operative Instructions  Ophthalmic Plastic and Reconstructive Surgery    Ale Vergara M.D.     All instructions apply to the operated eye(s) or eyelid(s).    Wound care and personal care    ? Apply ice compresses 15 minutes on 15 minutes off while awake for 2 days, then switch to warm water compresses 4 times a day until seen by your physician. For warm packs you can place a cup of dry uncooked rice in a clean cotton sock. Then place sock in microwave 30 seconds to one minute. Next place the warm sock into a plastic bag and wrap the bag with clean warm wet washcloth and place over operated eye.    ? You may shower or wash your hair the day after surgery. Do not bathe or go swimming for 1 week to prevent contamination of your wounds.  ? Do not apply make-up to the eyes or eyelids for 2 weeks after surgery.  ? Expect some swelling, bruising, black eye (even into the lower eyelids and cheeks). Also expect serum caking, crusting and discharge from the eye and/or incisions. A small amount of surface bleeding is normal for the first 48 hours.  ? Your eye(s) and eyelid(s) may be painful and tender. This is normal after surgery.      Contact information and follow-up  ? Return to the Eye Clinic for a follow-up appointment with your physician as  scheduled. If no appointment has been scheduled:     -  [facility] eye clinic: [phone] for an appointment with Dr. Vergara within 3 weeks from your date of surgery.     ?  For severe pain, bleeding, or loss of vision, call the [facility] at [phone] or [facility] at [phone].     After hours or on weekends and holidays, call [phone] and ask to speak with the ophthalmologist on call.    An on call person can be reached after hours for concerns. The on call doctor should not call in medication refill requests after hours or on weekends, so please plan accordingly. An effort has been made to provide adequate pain medications following every surgery, and refills will not be provided in most instances. Narcotic pain medications cannot be called in.     Activity restrictions and driving  ? Avoid heavy lifting, bending, exercise or strenuous activity for 1 week after surgery.  You may resume other activities and return to work as tolerated.  ? You may not resume driving until have you stopped using narcotic pain medications (such as Norco, Percocet, Tylenol #3).    Medications  ? Restart all your regular home medications and eye drops. If you take Plavix or  Aspirin on a regular basis, wait for 72 hours after your surgery before restarting these in order to decrease the risk of bleeding complications.  ? Avoid aspirin and aspirin-like medications (Motrin, Aleve, Ibuprofen, Nilda-  [place] etc) for 72 hours to reduce the risk of bleeding. You may take Tylenol  (acetaminophen) for pain.  ? In addition to your home medications, take the following post-operative medications as prescribed by your physician.    ? Apply erythromycin ophthalmic ointment to all sutures three times a day              Follow-ups after your visit        Follow-up notes from your care team     Return in about 3 weeks (around 9/19/2018).      Your next 10 appointments already scheduled     Sep 12, 2018  2:15 PM CDT   PROCEDURE with Ale Vergara MD   [facility] ([facility])    [address] [address] [place] [id number]    [phone]              Who to contact     If you have questions or need follow up information about today's clinic visit or your schedule please contact [facility] directly at [phone].  Normal or non-critical lab and imaging results will be communicated to you by MyChart, letter or phone within 4 business days after the clinic has received the results. If you do not hear from us within 7 days, please contact the clinic through MyChart or phone. If you have a critical or abnormal lab result, we will notify you by phone as soon as possible.  Submit refill requests through [employer] or call your pharmacy and they will forward the refill request to us. Please allow 3 business days for your refill to be completed.          Additional Information About Your Visit        [employer] Information     [employer] gives you secure access to your electronic health record. If you see a primary care provider, you can also send messages to your care team and make appointments. If you have questions, please call your primary care clinic.  If you do not have a primary care provider, please call [phone] and they will assist you.      [employer] is an electronic gateway that provides easy, online access to your medical records. With [employer], you can request a clinic appointment, read your test results, renew a prescription or communicate with your care team.     To access your existing account, please contact your [facility] or call [phone] for assistance.        Care EveryWhere ID     This is your Care EveryWhere ID. This could be used by other organizations to access your [place] medical records  [id number]         Blood Pressure from Last 3 Encounters:   12/13/17 133/73   06/13/14 124/81   05/17/12 121/73    Weight from Last 3 Encounters:   02/22/18 78.9 kg (174 lb)   11/17/17 75.8 kg (167 lb)   11/08/17 75.8 kg (167 lb)              We Performed the Following     Ectropion  repair     Sever Tarsorrhaphy        Primary Care Provider Office Phone # Fax #    James Goodwin [phone]       ZAHRA COLE [facility] [address]  [facility] [address]        Equal Access to Services     MISHA ALMODOVAR : Hadii aad ku hadasho Soomaali, waaxda luqadaha, qaybta kaalmada adeegyada, waxay idiin hayaan adeeg kharash lachang olivera. So [facility] [phone].    ATENCIÓN: Si habla español, tiene a dupont disposición servicios gratuitos de asistencia lingüística. Llame al [phone].    We comply with applicable federal civil rights laws and [place] laws. We do not discriminate on the basis of race, color, national origin, age, disability, sex, sexual orientation, or gender identity.            Thank you!     Thank you for choosing [facility]  for your care. Our goal is always to provide you with excellent care. Hearing back from our patients is one way we can continue to improve our services. Please take a few minutes to complete the written survey that you may receive in the mail after your visit with us. Thank you!             Your Updated Medication List - Protect others around you: Learn how to safely use, store and throw away your medicines at www.disposemymeds.org.          This list is accurate as of 8/29/18  2:15 PM.  Always use your most recent med list.                   Brand Name Dispense Instructions for use Diagnosis    * AMYLASE CONCENTRATE PO      Take  by mouth.        * DIGESTIVE ENZYMES PO      Take  by mouth.        CURCUMIN           HERBAL ENERGY COMPLEX PO      Take  by mouth.        Iodine Bell           MAG-CAPS OR      Take  by mouth.        MODIFIED CITRUS PECTIN 100-25-2.5 MG Tabs      Take  by mouth.        UBIQUINOL PO      Take  by mouth.        VALTREX PO      Take 50 mg by mouth        VITAMIN D3           * Notice:  This list has 2 medication(s) that are the same as other medications prescribed for you. Read the directions  carefully, and ask your doctor or other care provider to review them with you.

## 2018-08-29 NOTE — PROGRESS NOTES
PREOPERATIVE DIAGNOSIS: Left lower eyelid ectropion, facial paralysis, and history of left lateral tarsorrhaphy.   POSTOPERATIVE DIAGNOSIS: Left lower eyelid ectropion, facial paralysis, and history of left lateral tarsorrhaphy.   PROCEDURE:  Left lower eyelid ectropion repair by tarsal strip procedure and left severing of permanent tarsorrhaphy   ANESTHESIA: 1% lidocaine with epinephrine  SURGEON: Ale Vergara MD.  ASSISTANT: Fortunato Modi MD, MD  COMPLICATIONS: None.   ESTIMATED BLOOD LOSS: Less than 5 mL.   HISTORY: Chirag Narvaez  presented with a history of facial paralysis, and had undergone tarsorrhaphy in the past. He now was symptomatic of ectropion and the tarsorrhaphy was obstructing his vision with left gaze. We discussed options and he elected to proceed. After the risks, benefits and alternatives to the proposed procedure were explained, informed consent was obtained.   DESCRIPTION OF PROCEDURE: Chirag Narvaez was brought to the room and placed supine on the table. The upper and lower lids and lateral canthal areas were infiltrated with local anesthetic. The area was prepped and draped in the typical fashion. Attention was directed to the left side. A hemostat was placed over the area of the tarsorrhaphy. A Hilton was then used to sever the adhesions between the upper and lower eyelid extending all the way laterally. Hemostasis was obtained. Lateral canthotomy and inferior cantholysis was performed with Hilton scissors. A lateral tarsal strip was fashioned with the high temperature cautery and the hilton scissors. The tarsal strip was secured to the lateral orbital rim periosteum with a double-armed 5-0 vicryl suture in a horizontal mattress fashion. Lateral canthal angle was closed with a gray line to gray line suture of 5-0 Vicryl tied internally. The patient tolerated the procedure well. Chirag Narvaez left the room in stable condition.   I was present for the entire procedure. Ale  MD Ursula

## 2018-09-12 ENCOUNTER — OFFICE VISIT (OUTPATIENT)
Dept: OPHTHALMOLOGY | Facility: CLINIC | Age: 63
End: 2018-09-12
Payer: MEDICARE

## 2018-09-12 DIAGNOSIS — H02.236: ICD-10-CM

## 2018-09-12 DIAGNOSIS — H02.125 MECHANICAL ECTROPION OF LEFT LOWER EYELID: Primary | ICD-10-CM

## 2018-09-12 PROCEDURE — 99024 POSTOP FOLLOW-UP VISIT: CPT | Performed by: OPHTHALMOLOGY

## 2018-09-12 ASSESSMENT — VISUAL ACUITY
OS_CC: 20/25
METHOD: SNELLEN - LINEAR
OS_CC+: -2
OD_SC: 20/20

## 2018-09-12 NOTE — PROGRESS NOTES
Chirag Narvaez is status post left lower eyelid ectropion repiar and severing tarso.  Incision(s) healing well.  The lid(s)  is  in excellent position.    I have recommended:  * will f/u with Dr. Friedman (lost one scleral lens).     Attending Physician Attestation:  Complete documentation of historical and exam elements from today's encounter can be found in the full encounter summary report (not reduplicated in this progress note).  I personally obtained the chief complaint(s) and history of present illness.  I confirmed and edited as necessary the review of systems, past medical/surgical history, family history, social history, and examination findings as documented by others; and I examined the patient myself.  I personally reviewed the relevant tests, images, and reports as documented above.  I formulated and edited as necessary the assessment and plan and discussed the findings and management plan with the patient and family. - Ale Vergara MD

## 2018-09-12 NOTE — NURSING NOTE
Patient presents with:  Post Op (Ophthalmology) Left Eye: S/P Left lower eyelid ectropion repair by tarsal strip procedure and left severing of permanent tarsorrhaphy 08/29/2018      Referring Provider:  No referring provider defined for this encounter.    HPI    Informant(s):  pt   Affected eye(s):  Left   Symptoms:              Comments:  Patient happy with results Peripheral vision has improved left eye and scleral lens insertion is easier.               Roro Sheppard, COA

## 2018-09-12 NOTE — MR AVS SNAPSHOT
After Visit Summary   9/12/2018    Chirag Narvaez    MRN: [id number]           Patient Information     Date Of Birth          1955        Visit Information        Provider Department      9/12/2018 2:15 PM Ale Vergara MD [facility]        Today's Diagnoses     Mechanical ectropion of left lower eyelid    -  1    Paralytic lagophthalmos of left eye, unspecified eyelid           Follow-ups after your visit        Who to contact     If you have questions or need follow up information about today's clinic visit or your schedule please contact [facility] directly at [phone].  Normal or non-critical lab and imaging results will be communicated to you by MyChart, letter or phone within 4 business days after the clinic has received the results. If you do not hear from us within 7 days, please contact the clinic through [employer] or phone. If you have a critical or abnormal lab result, we will notify you by phone as soon as possible.  Submit refill requests through [employer] or call your pharmacy and they will forward the refill request to us. Please allow 3 business days for your refill to be completed.          Additional Information About Your Visit        MyChart Information     [employer] gives you secure access to your electronic health record. If you see a primary care provider, you can also send messages to your care team and make appointments. If you have questions, please call your primary care clinic.  If you do not have a primary care provider, please call [phone] and they will assist you.      [employer] is an electronic gateway that provides easy, online access to your medical records. With [employer], you can request a clinic appointment, read your test results, renew a prescription or communicate with your care team.     To access your existing account, please contact your [facility] or call [phone] for  assistance.        Care EveryWhere ID     This is your Care EveryWhere ID. This could be used by other organizations to access your [place] medical records  [id number]         Blood Pressure from Last 3 Encounters:   12/13/17 133/73   06/13/14 124/81   05/17/12 121/73    Weight from Last 3 Encounters:   02/22/18 78.9 kg (174 lb)   11/17/17 75.8 kg (167 lb)   11/08/17 75.8 kg (167 lb)              Today, you had the following     No orders found for display       Primary Care Provider Office Phone # Fax #    James Goodwin [phone]       ZAHRA COLE [facility] [address] [facility] [address]        Equal Access to Services     MISHA ALMODOVAR : Hadii aad ku hadasho Soomaali, waaxda luqadaha, qaybta kaalmada adeegyada, waxay idiin hayaan amadeo mccullough . So [facility] [phone].    ATENCIÓN: Si habla español, tiene a dupont disposición servicios gratuitos de asistencia lingüística. [facility] [phone].    We comply with applicable federal civil rights laws and [place] laws. We do not discriminate on the basis of race, color, national origin, age, disability, sex, sexual orientation, or gender identity.            Thank you!     Thank you for choosing [facility]  for your care. Our goal is always to provide you with excellent care. Hearing back from our patients is one way we can continue to improve our services. Please take a few minutes to complete the written survey that you may receive in the mail after your visit with us. Thank you!             Your Updated Medication List - Protect others around you: Learn how to safely use, store and throw away your medicines at www.disposemymeds.org.          This list is accurate as of 9/12/18  2:42 PM.  Always use your most recent med list.                   Brand Name Dispense Instructions for use Diagnosis    * AMYLASE CONCENTRATE PO      Take  by mouth.        * DIGESTIVE ENZYMES PO      Take  by mouth.         CURCUMIN           HERBAL ENERGY COMPLEX PO      Take  by mouth.        Iodine Bell           MAG-CAPS OR      Take  by mouth.        MODIFIED CITRUS PECTIN 100-25-2.5 MG Tabs      Take  by mouth.        UBIQUINOL PO      Take  by mouth.        VALTREX PO      Take 50 mg by mouth        VITAMIN D3           * Notice:  This list has 2 medication(s) that are the same as other medications prescribed for you. Read the directions carefully, and ask your doctor or other care provider to review them with you.

## 2018-09-26 ENCOUNTER — OFFICE VISIT (OUTPATIENT)
Dept: OPTOMETRY | Facility: CLINIC | Age: 63
End: 2018-09-26
Payer: COMMERCIAL

## 2018-09-26 DIAGNOSIS — H16.212 EXPOSURE KERATOPATHY, LEFT: Primary | ICD-10-CM

## 2018-09-26 DIAGNOSIS — H59.89 CORNEAL ECTASIA DUE TO LASER IN SITU KERATOMILEUSIS OF LEFT EYE: ICD-10-CM

## 2018-09-26 DIAGNOSIS — H18.712 CORNEAL ECTASIA DUE TO LASER IN SITU KERATOMILEUSIS OF LEFT EYE: ICD-10-CM

## 2018-09-26 ASSESSMENT — VISUAL ACUITY
METHOD: SNELLEN - LINEAR
OD_SC: 20/20-2
CORRECTION_TYPE: CONTACTS
OS_CC: 20/40

## 2018-09-26 ASSESSMENT — REFRACTION_CURRENTRX
OS_DIAMETER: 17.0
OS_AXIS: 155
OS_BASECURVE: 8.129
OS_CYLINDER: +1.00
OS_DIAMETER: 17.0
OS_BASECURVE: 8.129
OS_SPHERE: +3.37
OS_ADDL_SPECS: OPTIMUM EXTRA
OS_ADDL_SPECS: OPTIMUM EXTRA
OS_SPHERE: +4.12

## 2018-09-26 ASSESSMENT — CUP TO DISC RATIO
OS_RATIO: 0.25
OD_RATIO: 0.25

## 2018-09-26 ASSESSMENT — REFRACTION_MANIFEST
OD_CYLINDER: SPHERE
OD_SPHERE: -0.50

## 2018-09-26 ASSESSMENT — CONF VISUAL FIELD
OD_NORMAL: 1
OS_NORMAL: 1

## 2018-09-26 ASSESSMENT — TONOMETRY
IOP_METHOD: ICARE
OS_IOP_MMHG: 09
OD_IOP_MMHG: 08

## 2018-09-26 ASSESSMENT — SLIT LAMP EXAM - LIDS: COMMENTS: NORMAL

## 2018-09-26 ASSESSMENT — EXTERNAL EXAM - RIGHT EYE: OD_EXAM: NORMAL

## 2018-09-26 NOTE — MR AVS SNAPSHOT
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 If you see a primary care provider, you can also send messages to your care team and make appointments. If you have questions, please call your primary care clinic.  If you do not have a primary care provider, please call [phone] and they will assist you.      [employer] is an electronic gateway that provides easy, online access to your medical records. With [employer], you can request a clinic appointment, read your test results, renew a prescription or communicate with your care team.     To access your existing account, please contact your [facility] or call [phone] for assistance.        Care EveryWhere ID     This is your Care EveryWhere ID. This could be used by other organizations to access your [place] medical records  [id number]         Blood Pressure from Last 3 Encounters:   12/13/17 133/73   06/13/14 124/81   05/17/12 121/73    Weight from Last 3 Encounters:   02/22/18 78.9 kg (174 lb)   11/17/17 75.8 kg (167 lb)   11/08/17 75.8 kg (167 lb)              Today, you had the following     No orders found for display       Primary Care Provider Office Phone # Fax #     James Goodwin [phone]       ZAHRA COLE [facility] [address]  [facility] [address]        Equal Access to Services     MISHA ALMODOVAR : Hadii leila ku thanho Sopaytonali, waaxda luqadaha, qaybta kaalmada adeegyada, florina rodriguez laSupriyatona olivera. So [facility] [phone].    ATENCIÓN: Si habla español, tiene a dupont disposición servicios gratuitos de asistencia lingüística. Maddie al [phone].    We comply with applicable federal civil rights laws and [place] laws. We do not discriminate on the basis of race, color, national origin, age, disability, sex, sexual orientation, or gender identity.            Thank you!     Thank you for choosing [facility]  for your care. Our goal is always to provide you with excellent care. Hearing back from our patients is one way we can continue to improve our services. Please take a few minutes to complete the written survey that you may receive in the mail after your visit with us. Thank you!             Your Updated Medication List - Protect others around you: Learn how to safely use, store and throw away your medicines at www.disposemymeds.org.          This list is accurate as of 9/26/18  2:12 PM.  Always use your most recent med list.                   Brand Name Dispense Instructions for use Diagnosis    * AMYLASE CONCENTRATE PO      Take  by mouth.        * DIGESTIVE ENZYMES PO      Take  by mouth.        CURCUMIN           HERBAL ENERGY COMPLEX PO      Take  by mouth.        Iodine Bell           MAG-CAPS OR      Take  by mouth.        MODIFIED CITRUS PECTIN 100-25-2.5 MG Tabs      Take  by mouth.        UBIQUINOL PO      Take  by mouth.        VALTREX PO      Take 50 mg by mouth        VITAMIN D3           * Notice:  This list has 2 medication(s) that are the same as other medications prescribed for you. Read the directions carefully, and ask your doctor or other care provider to review them with you.

## 2018-09-26 NOTE — PROGRESS NOTES
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 Order and mail to pt. Monitor 1 year    Contact Lens Billing  V-Code:  - Scheral Cover Shell  Final Contact Lens Rx      Brand Base Curve Diameter Sphere Cylinder Axis Lens Addl. Specs   Right           Left Eyeprint 8.129 17.0 +4.12 +1.00 155 BOZ: 9.35, CT: 0.436 Optimum Extra            # of units: 1  Price per Unit: $900    This patient requires contact lenses that are medically necessary for either improvement in vision over spectacles, support of the ocular surface, or other therapeutic benefit. These are not cosmetic contact lenses.     Encounter Diagnoses   Name Primary?     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

## 2019-06-17 DIAGNOSIS — C76.0 MALIGNANT NEOPLASM OF HEAD, FACE, AND NECK (H): Primary | ICD-10-CM

## 2019-06-18 ENCOUNTER — TELEPHONE (OUTPATIENT)
Dept: OTOLARYNGOLOGY | Facility: CLINIC | Age: 64
End: 2019-06-18

## 2019-06-18 NOTE — TELEPHONE ENCOUNTER
Received phone call from CRISTÓBAL Templeton from [facility]. Santo stated that pt was in for a recent appointment and a small firm neck mass was noted to the left neck. D/t hx wanted to get in touch regarding POC. Instructed to order MRI neck for further evaluation. Santo in agreement and states she will contact this nurse with results. Dr. Patiño has been notified; will follow closely.     Natice Schwab, RN BSN

## 2019-06-26 ENCOUNTER — TRANSFERRED RECORDS (OUTPATIENT)
Dept: HEALTH INFORMATION MANAGEMENT | Facility: CLINIC | Age: 64
End: 2019-06-26

## 2019-06-26 ENCOUNTER — TELEPHONE (OUTPATIENT)
Dept: OTOLARYNGOLOGY | Facility: CLINIC | Age: 64
End: 2019-06-26

## 2019-06-26 NOTE — TELEPHONE ENCOUNTER
Phone Call:     Contact Name [facility] ph. [phone]   Outcome 6/26/19 CT Neck images will be pushed to [facility] PACS, report will be faxed to clinic's RightFax when ready     Images in PACS and report dropped off with RN - Ankur

## 2019-08-28 ENCOUNTER — DOCUMENTATION ONLY (OUTPATIENT)
Dept: CARE COORDINATION | Facility: CLINIC | Age: 64
End: 2019-08-28

## 2019-09-25 ENCOUNTER — OFFICE VISIT (OUTPATIENT)
Dept: OTOLARYNGOLOGY | Facility: CLINIC | Age: 64
End: 2019-09-25
Payer: COMMERCIAL

## 2019-09-25 VITALS
RESPIRATION RATE: 15 BRPM | SYSTOLIC BLOOD PRESSURE: 141 MMHG | HEART RATE: 62 BPM | BODY MASS INDEX: 23.74 KG/M2 | DIASTOLIC BLOOD PRESSURE: 84 MMHG | HEIGHT: 70 IN | WEIGHT: 165.8 LBS

## 2019-09-25 DIAGNOSIS — H61.23 BILATERAL IMPACTED CERUMEN: ICD-10-CM

## 2019-09-25 DIAGNOSIS — C49.9 FIBROMYXOSARCOMA (H): ICD-10-CM

## 2019-09-25 DIAGNOSIS — C76.0 MALIGNANT NEOPLASM OF HEAD, FACE, AND NECK (H): Primary | ICD-10-CM

## 2019-09-25 ASSESSMENT — MIFFLIN-ST. JEOR: SCORE: 1558.31

## 2019-09-25 ASSESSMENT — PAIN SCALES - GENERAL: PAINLEVEL: SEVERE PAIN (6)

## 2019-09-25 NOTE — PATIENT INSTRUCTIONS
Chirag Narvaez,    It was a pleasure to see you today.    1. You were seen in the ENT Clinic today by Safia Tucker PA-C    If you have any questions or concerns after your appointment, please call   - Option 1: ENT Clinic: [phone]      2. Please return in 2 years for a recheck with MRI of the neck prior.      Thank you,  Safia Tucker PA-C  Otolaryngology  Head & Neck Surgery  [phone]

## 2019-09-25 NOTE — LETTER
9/25/2019       RE: Chirag Narvaez  [address] [place] [address]     Dear Colleague,    Thank you for referring your patient, Chirag Narvaez, to the [facility]. Please see a copy of my visit note below.    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  He underwent a salvage parotidectomy, facial nerve sacrifice, and resection of the masseter muscle by Dr. Patiño in April 2012, along with free flap reconstruction using a left lateral free flap by Dr. Tyler  in April 2012. He then had a reconstruction of the facial suspension apparatus by Dr. Jimenez on April 12, 2012.  Since then he has had 2 left eye revision procedures, once in December 2017 and another in September 2018.         HPI:  Mr. Narvaez reports that he is doing well.  He states that he tolerated the left eye revision procedures without any significant problems.  Other than the episode this past June, where he had an enlarged lymph node in the left posterior neck, he has not had any new concerns.  He had a CT scan done at an outside facility at that time that was negative.    Chirag denies any hoarseness of voice, referred otalgia, odynophagia, dysphagia, dizziness/lightheadedness, loss of balance, facial paresthesias, tinnitus, hemoptysis, other pain, bleeding, and recurrent/new lumps or bumps. Weight is stable.    He does have some chronic hearing problems in his left ear and is wondering if I can look in his ears today.  He thinks it might be wax in there because he had trouble with wax buildup in the past.          Physical Exam:  BP (!) 141/84 (BP Location: Right arm, Patient Position: Sitting, Cuff Size: Adult Regular)   Pulse 62   Resp 15   Ht 1.778 m (5'  "10\")   Wt 75.2 kg (165 lb 12.8 oz)   BMI 23.79 kg/m       Constitutional: The patient is unaccompanied, well-groomed, and in no acute distress.    Head: Normocephalic and atraumatic. Left sided facial flap.  His tendon is still intact and he has good facial position.  Eyes: Pupils were equal and reactive.  Extraocular movement intact.  Inability to follow close the left upper eyelid  Ears: Pinnae and tragus non-tender.  EACs impacted with cerumen bilaterally, once cerumen removed, TMs cecy bilaterally  Nose: Sinuses were non-tender.  Anterior rhinoscopy revealed midline septum and absence of purulence or polyps.    Oral Cavity: Normal tongue, floor of mouth, buccal mucosa, and palate.  No lesions or masses on inspection or palpation.    Oropharynx: Normal mucosa, palate symmetric with normal elevation. No abnormal lymph tissue in the oropharynx.  Pterygoid region non-tender.   Neck: Supple with normal laryngeal and tracheal landmarks.  The parotid beds were without masses.  No palpable thyroid.  Normal range of motion  Lymphatic: There is no palpable lymphadenopathy in the neck.   Neurologic: Alert and oriented x 3.  CN's III-XII within normal limits.  Voice normal.       PHYSICAL EXAM/PROCEDURE:  The left ear was examined under the microscope.  There was noted to be a cerumen impaction.  It was cleaned with suction and alligator forceps.  The TM is otherwise clear.  The left ear was also examined under the microscope and noted to have a cerumen impaction.  It was cleaned with suction and alligator forceps.  The TM is otherwise clear.  The patient tolerated the procedure and noted improvement of symptoms.      Diagnostic Imagine:  Reviewed CT scans from [place], [place], done on 6/26/2019:  Impression:  1.  Stable neck CT.  No abnormalities are noted in the area of palpable concern in the left posterior lateral neck.  2.  Stable postoperative changes of the left parotid and submandibular " salivary gland resection.  3.  Stable tonsillar asymmetry with mild fullness on the right.  4.  Stable postradiation scarring in the left lung apex.  5.  Stable benign lucent lesions in the right temporal calvarium.  6.  Stable metallic implant adjacent to the left orbit.      IMPRESSION AND PLAN:    Malignant neoplasm of head, face, and neck/Fibromysocarcoma.  Patient doing well.  No concerning history or exam findings.  Patient will return in 2 years for a follow-up visit with an MRI of the soft tissue neck at that time.  If he has new symptoms between now and that visit, he will call with any questions or concerns.    Bilateral impacted cerumen  Patient with bilateral cerumen impaction which was removed here in clinic today.  I suggest that he see an ENT for ear cleaning on an annual basis, specifically for the left ear given the narrowness of the canal because of the reconstruction.  He is agreeable to this and will likely try to find an ENT physician closer to home to take care of this for him that he knows he is welcome to come back here if needed.    Safia Tucker PA-C  Otolaryngology  Head & Neck Surgery  [phone]     CC:  James Patiño MD  Otolaryngology/Head & Neck Surgery  [facility] 396    Lukas Baer MD   [facility] 286      Josefa Currie MD    [facility] 396      Moses Tyler MD   [facility] 396

## 2019-09-25 NOTE — NURSING NOTE
"Chief Complaint   Patient presents with     RECHECK     fibromyxosarcoma     BP (!) 141/84 (BP Location: Right arm, Patient Position: Sitting, Cuff Size: Adult Regular)   Pulse 62   Resp 15   Ht 1.778 m (5' 10\")   Wt 75.2 kg (165 lb 12.8 oz)   BMI 23.79 kg/m      Rowena Gill CMA    "

## 2019-09-25 NOTE — PROGRESS NOTES
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  He underwent a salvage parotidectomy, facial nerve sacrifice, and resection of the masseter muscle by Dr. Patiño in April 2012, along with free flap reconstruction using a left lateral free flap by Dr. Tyler in April 2012. He then had a reconstruction of the facial suspension apparatus by Dr. Jimenez on April 12, 2012.  Since then he has had 2 left eye revision procedures, once in December 2017 and another in September 2018.         HPI:  Mr. Narvaez reports that he is doing well.  He states that he tolerated the left eye revision procedures without any significant problems.  Other than the episode this past June, where he had an enlarged lymph node in the left posterior neck, he has not had any new concerns.  He had a CT scan done at an outside facility at that time that was negative.    Chirag denies any hoarseness of voice, referred otalgia, odynophagia, dysphagia, dizziness/lightheadedness, loss of balance, facial paresthesias, tinnitus, hemoptysis, other pain, bleeding, and recurrent/new lumps or bumps. Weight is stable.    He does have some chronic hearing problems in his left ear and is wondering if I can look in his ears today.  He thinks it might be wax in there because he had trouble with wax buildup in the past.          Physical Exam:  BP (!) 141/84 (BP Location: Right arm, Patient Position: Sitting, Cuff Size: Adult Regular)   Pulse 62   Resp 15   Ht 1.778 m (5' 10\")   Wt 75.2 kg (165 lb 12.8 oz)   BMI 23.79 kg/m      Constitutional: The patient is unaccompanied, well-groomed, and in no acute distress.    Head: Normocephalic and atraumatic. Left sided facial flap.  His tendon is still intact and he has good facial position.  Eyes: Pupils were equal " and reactive.  Extraocular movement intact.  Inability to follow close the left upper eyelid  Ears: Pinnae and tragus non-tender.  EACs impacted with cerumen bilaterally, once cerumen removed, TMs cecy bilaterally  Nose: Sinuses were non-tender.  Anterior rhinoscopy revealed midline septum and absence of purulence or polyps.    Oral Cavity: Normal tongue, floor of mouth, buccal mucosa, and palate.  No lesions or masses on inspection or palpation.    Oropharynx: Normal mucosa, palate symmetric with normal elevation. No abnormal lymph tissue in the oropharynx.  Pterygoid region non-tender.   Neck: Supple with normal laryngeal and tracheal landmarks.  The parotid beds were without masses.  No palpable thyroid.  Normal range of motion  Lymphatic: There is no palpable lymphadenopathy in the neck.   Neurologic: Alert and oriented x 3.  CN's III-XII within normal limits.  Voice normal.       PHYSICAL EXAM/PROCEDURE:  The left ear was examined under the microscope.  There was noted to be a cerumen impaction.  It was cleaned with suction and alligator forceps.  The TM is otherwise clear.  The left ear was also examined under the microscope and noted to have a cerumen impaction.  It was cleaned with suction and alligator forceps.  The TM is otherwise clear.  The patient tolerated the procedure and noted improvement of symptoms.      Diagnostic Imagine:  Reviewed CT scans from [place], [place], done on 6/26/2019:  Impression:  1.  Stable neck CT.  No abnormalities are noted in the area of palpable concern in the left posterior lateral neck.  2.  Stable postoperative changes of the left parotid and submandibular salivary gland resection.  3.  Stable tonsillar asymmetry with mild fullness on the right.  4.  Stable postradiation scarring in the left lung apex.  5.  Stable benign lucent lesions in the right temporal calvarium.  6.  Stable metallic implant adjacent to the left orbit.      IMPRESSION AND  PLAN:    Malignant neoplasm of head, face, and neck/Fibromysocarcoma.  Patient doing well.  No concerning history or exam findings.  Patient will return in 2 years for a follow-up visit with an MRI of the soft tissue neck at that time.  If he has new symptoms between now and that visit, he will call with any questions or concerns.    Bilateral impacted cerumen  Patient with bilateral cerumen impaction which was removed here in clinic today.  I suggest that he see an ENT for ear cleaning on an annual basis, specifically for the left ear given the narrowness of the canal because of the reconstruction.  He is agreeable to this and will likely try to find an ENT physician closer to home to take care of this for him that he knows he is welcome to come back here if needed.         Safia Tucker PA-C  Otolaryngology  Head & Neck Surgery  [phone]       CC:  James Patiño MD  Otolaryngology/Head & Neck Surgery  [facility] 396    Lukas aBer MD   [facility] 286      Josefa Currie MD    [facility] 396      Moses Tyler MD   [facility] 396

## 2020-03-02 ENCOUNTER — HEALTH MAINTENANCE LETTER (OUTPATIENT)
Age: 65
End: 2020-03-02

## 2020-09-01 DIAGNOSIS — C49.9 FIBROMYXOSARCOMA (H): Primary | ICD-10-CM

## 2020-09-01 DIAGNOSIS — C76.0 MALIGNANT NEOPLASM OF HEAD, FACE, AND NECK (H): ICD-10-CM

## 2020-09-02 ENCOUNTER — TELEPHONE (OUTPATIENT)
Dept: OTOLARYNGOLOGY | Facility: CLINIC | Age: 65
End: 2020-09-02

## 2020-09-02 NOTE — TELEPHONE ENCOUNTER
Called patient to schedule, per casper from Imani LAMB RN:    1. MRI soft tissue neck    2. Follow up with Dr. Martines after completion of MRI    Appointment Type - [facility] RETURN  Provider - Dr Martines  Appointment Notes - Dr Patiño Patient: follow up, MRI scan prior    LVM with scheduling instructions and the ENT call center number. Also explained that Dr Patiño is still in interim  position for [facility], and Dr Martines is seeing his patients at this time.     Patient is welcome to schedule in next available slot of provider, please ensure that MRI is complete prior to appointment.

## 2020-12-14 ENCOUNTER — HEALTH MAINTENANCE LETTER (OUTPATIENT)
Age: 65
End: 2020-12-14

## 2021-04-18 ENCOUNTER — HEALTH MAINTENANCE LETTER (OUTPATIENT)
Age: 66
End: 2021-04-18

## 2021-10-02 ENCOUNTER — HEALTH MAINTENANCE LETTER (OUTPATIENT)
Age: 66
End: 2021-10-02

## 2021-11-10 ENCOUNTER — OFFICE VISIT (OUTPATIENT)
Dept: OPHTHALMOLOGY | Facility: CLINIC | Age: 66
End: 2021-11-10
Attending: OPHTHALMOLOGY
Payer: MEDICARE

## 2021-11-10 DIAGNOSIS — H02.236: ICD-10-CM

## 2021-11-10 DIAGNOSIS — Z97.3 USES CONTACT LENSES: Primary | ICD-10-CM

## 2021-11-10 DIAGNOSIS — H25.813 MIXED TYPE AGE-RELATED CATARACT, BOTH EYES: ICD-10-CM

## 2021-11-10 DIAGNOSIS — H04.123 DRY EYES, BILATERAL: ICD-10-CM

## 2021-11-10 PROCEDURE — G0463 HOSPITAL OUTPT CLINIC VISIT: HCPCS

## 2021-11-10 PROCEDURE — 92004 COMPRE OPH EXAM NEW PT 1/>: CPT | Mod: GC | Performed by: OPHTHALMOLOGY

## 2021-11-10 ASSESSMENT — VISUAL ACUITY
CORRECTION_TYPE: CONTACTS
OD_SC: 20/20
OS_CC: 20/20
METHOD: SNELLEN - LINEAR

## 2021-11-10 ASSESSMENT — CONF VISUAL FIELD
OD_NORMAL: 1
OS_NORMAL: 1

## 2021-11-10 ASSESSMENT — SLIT LAMP EXAM - LIDS: COMMENTS: NORMAL

## 2021-11-10 ASSESSMENT — TONOMETRY
IOP_METHOD: ICARE
OD_IOP_MMHG: 10
OS_IOP_MMHG: 08

## 2021-11-10 ASSESSMENT — EXTERNAL EXAM - RIGHT EYE: OD_EXAM: NORMAL

## 2021-11-10 NOTE — PROGRESS NOTES
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 Patient states doing well with use of scleral lens OS with continued improvement of pain and irritation. Saw Dr Friedman, fitted for new Sceral lens with Hydra-PEG coating.     Interval: Last visit with Dr. Cevallos and Dr. Vergara 2018. Reports his scleral lens in the left eye his getting build up at 9 and 3 o'clock. Happens after a few hours of wear after cleaning. Last saw Dr. Friedman in 2018. CTL is 3 years old. Blurring vision. No eye pain, controlled. Lagophthalmos controlled per pt. JUAN JOSE 2018. No f/f.     POHx:  -hx of LASIK OU with post-LASIK ectasia    -hx of scleral lens use OS    Current Meds:   None     Assessment & Plan   Chirag Narvaez is a 63 year old male with the following diagnoses:     1. Exposure keratoconjunctivitis, left  Secondary to facial surgery, lid dysfunction  Much improved with scleral lens, tarso, gold weight  Doing well. No evidence of dryness today.  Using Tranquilize moisture eye chambers at bedtime.    2. Mechanical lagophthalmos, left  Continued 2 millimeters lag with good Cobb   Seen by Dr. Vergara 1/3/2018  Would need follow up with Dr. DOLAN next available    3. Corneal ectasia, unspecified laterality  s/p laser in situ keratomileusis (LASIK), with add'l thinning from #1  Appears stable  Vision unchanged  Continue f/up with Dr. DOLAN    RTC: prn, recommend DFE  Follow up with Dr. Friedman next available    Selma Church MD  PGY-3 Resident Physician  Department of Ophthalmology    Attending Physician Attestation:  Complete documentation of historical and exam elements from today's encounter can be found in the full encounter summary report (not reduplicated in this progress note).  I personally obtained the chief complaint(s) and history of present illness.   I confirmed and edited as necessary the review of systems, past medical/surgical history, family history, social history, and examination findings as documented by others; and I examined the patient myself.  I personally reviewed the relevant tests, images, and reports as documented above.  I formulated and edited as necessary the assessment and plan and discussed the findings and management plan with the patient and family. - Robbie Pizarro MD

## 2021-11-10 NOTE — NURSING NOTE
Chief Complaints and History of Present Illnesses   Patient presents with     Follow Up     Chief Complaint(s) and History of Present Illness(es)     Follow Up     Laterality: left eye    Onset: 1 year ago              Comments     Exposure keratoconjunctivitis, left eye-Pt. States that he is doing well. No change in VA BE. No pain BE. Has been getting some build up on contact lens lately.   Nayla Kam COT 12:23 PM November 10, 2021

## 2021-11-29 ENCOUNTER — OFFICE VISIT (OUTPATIENT)
Dept: OPTOMETRY | Facility: CLINIC | Age: 66
End: 2021-11-29
Payer: COMMERCIAL

## 2021-11-29 DIAGNOSIS — H02.234 PARALYTIC LAGOPHTHALMOS OF LEFT UPPER EYELID: ICD-10-CM

## 2021-11-29 DIAGNOSIS — H04.123 DRY EYES: Primary | ICD-10-CM

## 2021-11-29 DIAGNOSIS — H16.212 EXPOSURE KERATOPATHY, LEFT: ICD-10-CM

## 2021-11-29 ASSESSMENT — REFRACTION_CURRENTRX
OS_DIAMETER: 17.0
OS_CYLINDER: +1.00
OS_SPHERE: +4.12
OS_ADDL_SPECS: OPTIMUM EXTRA
OS_BRAND: EYEPRINT
OS_AXIS: 155
OS_BASECURVE: 8.129

## 2021-11-29 ASSESSMENT — VISUAL ACUITY
CORRECTION_TYPE: CONTACTS
OD_SC: 20/20-3
OS_CC: 20/20-3
METHOD: SNELLEN - LINEAR

## 2021-11-29 NOTE — PROGRESS NOTES
A/P  1.) Exposure keratopathy/lagophthalmos OS 2' to multiple facial surgeries  -Wearing scleral lens for ocular surface protection/therapeutic relief  -Excellent comfort/fit with Eyeprint lens, now able to wear all day  -Current lens fits well. Lens is 3 years old, getting interpalpebral band of nonwetting. Cleaned/polished/Tangible Boost in office today  -Pt would also like new lens/duplicate    2.) Corneal ectasia OS 2' to LASIK OU  -BCVA OS 20/20 with scleral lens toric OR today    Doing well in current lens. Order new duplicate and mail direct. F/u here 1-2 years, sooner prn with lens concerns    Contact Lens Billing  V-Code:  Scleral Cover Shell  Final Contact Lens Rx       Brand Base Curve Diameter Sphere Cylinder Axis Lens Addl. Specs    Right            Left Eyeprint 8.129 17.0 +5.12 -1.00 065 BOZ: 9.35, CT: 0.436 Optimum Extra, [id number]         # of units: 1  Price per Unit: $900    This patient requires contact lenses that are medically necessary for either improvement in vision over spectacles, support of the ocular surface, or other therapeutic benefit. These are not cosmetic contact lenses.     Encounter Diagnoses   Name Primary?     Dry eyes Yes     Exposure keratopathy, left      Paralytic lagophthalmos of left upper eyelid         30+ min spent on the date of encounter in direct care/counseling/review of charts/prescribing/documentation

## 2021-11-30 ASSESSMENT — SLIT LAMP EXAM - LIDS: COMMENTS: NORMAL

## 2021-11-30 ASSESSMENT — EXTERNAL EXAM - RIGHT EYE: OD_EXAM: NORMAL

## 2022-05-14 ENCOUNTER — HEALTH MAINTENANCE LETTER (OUTPATIENT)
Age: 67
End: 2022-05-14

## 2022-09-03 ENCOUNTER — HEALTH MAINTENANCE LETTER (OUTPATIENT)
Age: 67
End: 2022-09-03

## 2023-09-30 ENCOUNTER — HEALTH MAINTENANCE LETTER (OUTPATIENT)
Age: 68
End: 2023-09-30

## 2024-11-23 ENCOUNTER — HEALTH MAINTENANCE LETTER (OUTPATIENT)
Age: 69
End: 2024-11-23

## (undated) DEVICE — DRAPE SPLIT EENT 76X124" 3X28" 9447

## (undated) DEVICE — SOL WATER IRRIG 1000ML BOTTLE 07139-09

## (undated) DEVICE — SU PLAIN FAST ABSORB 6-0 PC-1 18" 1916G

## (undated) DEVICE — ESU ELEC NDL 1" COATED/INSULATED E1465

## (undated) DEVICE — SU VICRYL 4-0 PC-3 18" UND J845G

## (undated) DEVICE — MARKER SKIN DOUBLE TIP W/FLEXI-RULER W/LABELS

## (undated) DEVICE — PREP SKIN SCRUB TRAY 4461A

## (undated) DEVICE — SYR 10ML FINGER CONTROL W/O NDL 309695

## (undated) DEVICE — PACK MINOR EYE

## (undated) DEVICE — SU MERSILENE 5-0 S-24 18" 1764G

## (undated) DEVICE — BLADE KNIFE BEAVER MICROSHARP BLUE 7514

## (undated) DEVICE — PACK MINOR SBA15MIFSE

## (undated) DEVICE — GLOVE PROTEXIS W/NEU-THERA 7.5  2D73TE75

## (undated) DEVICE — SU VICRYL 4-0 P-3 18" UND  J494H

## (undated) DEVICE — SU PROLENE 5-0 P-3 18" 8698G

## (undated) DEVICE — NDL 19GA 1.5"

## (undated) DEVICE — ESU EYE HIGH TEMP 65410-183

## (undated) DEVICE — SYR 03ML LL W/O NDL

## (undated) DEVICE — ESU GROUND PAD ADULT W/CORD E7507

## (undated) DEVICE — BLADE KNIFE SURG 15 371115

## (undated) DEVICE — NDL 30GA 0.5" 305106

## (undated) RX ORDER — DEXAMETHASONE SODIUM PHOSPHATE 4 MG/ML
INJECTION, SOLUTION INTRA-ARTICULAR; INTRALESIONAL; INTRAMUSCULAR; INTRAVENOUS; SOFT TISSUE
Status: DISPENSED
Start: 2017-12-13

## (undated) RX ORDER — PROPOFOL 10 MG/ML
INJECTION, EMULSION INTRAVENOUS
Status: DISPENSED
Start: 2017-12-13

## (undated) RX ORDER — LIDOCAINE HYDROCHLORIDE 10 MG/ML
INJECTION, SOLUTION EPIDURAL; INFILTRATION; INTRACAUDAL; PERINEURAL
Status: DISPENSED
Start: 2017-12-13

## (undated) RX ORDER — FENTANYL CITRATE 50 UG/ML
INJECTION, SOLUTION INTRAMUSCULAR; INTRAVENOUS
Status: DISPENSED
Start: 2017-12-13

## (undated) RX ORDER — ONDANSETRON 2 MG/ML
INJECTION INTRAMUSCULAR; INTRAVENOUS
Status: DISPENSED
Start: 2017-12-13

## (undated) RX ORDER — LIDOCAINE HYDROCHLORIDE 20 MG/ML
INJECTION, SOLUTION EPIDURAL; INFILTRATION; INTRACAUDAL; PERINEURAL
Status: DISPENSED
Start: 2017-12-13

## (undated) RX ORDER — GENTAMICIN 40 MG/ML
INJECTION, SOLUTION INTRAMUSCULAR; INTRAVENOUS
Status: DISPENSED
Start: 2017-12-13